# Patient Record
Sex: MALE | Race: WHITE | Employment: FULL TIME | URBAN - NONMETROPOLITAN AREA
[De-identification: names, ages, dates, MRNs, and addresses within clinical notes are randomized per-mention and may not be internally consistent; named-entity substitution may affect disease eponyms.]

---

## 2016-06-20 LAB
ALBUMIN SERPL-MCNC: 4 G/DL
ALP BLD-CCNC: 61 U/L
ALT SERPL-CCNC: 27 U/L
ANION GAP SERPL CALCULATED.3IONS-SCNC: NORMAL MMOL/L
AST SERPL-CCNC: 26 U/L
BASOPHILS ABSOLUTE: 0 /ΜL
BASOPHILS RELATIVE PERCENT: 1 %
BILIRUB SERPL-MCNC: 0.5 MG/DL (ref 0.1–1.4)
BUN BLDV-MCNC: 11 MG/DL
CALCIUM SERPL-MCNC: 8.5 MG/DL
CHLORIDE BLD-SCNC: 103 MMOL/L
CHOLESTEROL, TOTAL: 226 MG/DL
CHOLESTEROL/HDL RATIO: ABNORMAL
CO2: 23 MMOL/L
CREAT SERPL-MCNC: 0.84 MG/DL
EOSINOPHILS ABSOLUTE: 0.2 /ΜL
EOSINOPHILS RELATIVE PERCENT: 3 %
GFR CALCULATED: 98
GLUCOSE BLD-MCNC: 86 MG/DL
HCT VFR BLD CALC: 39.2 % (ref 41–53)
HDLC SERPL-MCNC: 44 MG/DL (ref 35–70)
HEMOGLOBIN: 13.4 G/DL (ref 13.5–17.5)
LDL CHOLESTEROL CALCULATED: 165 MG/DL (ref 0–160)
LYMPHOCYTES ABSOLUTE: 1.6 /ΜL
LYMPHOCYTES RELATIVE PERCENT: 27 %
MCH RBC QN AUTO: 31.7 PG
MCHC RBC AUTO-ENTMCNC: 34.2 G/DL
MCV RBC AUTO: 93 FL
MONOCYTES ABSOLUTE: 0.4 /ΜL
MONOCYTES RELATIVE PERCENT: 8 %
NEUTROPHILS ABSOLUTE: 3.7 /ΜL
NEUTROPHILS RELATIVE PERCENT: 61 %
PDW BLD-RTO: 13.8 %
PLATELET # BLD: 222 K/ΜL
PMV BLD AUTO: ABNORMAL FL
POTASSIUM SERPL-SCNC: 4.2 MMOL/L
RBC # BLD: 4.23 10^6/ΜL
SODIUM BLD-SCNC: 141 MMOL/L
TOTAL PROTEIN: 6.4
TRIGL SERPL-MCNC: 85 MG/DL
VITAMIN D 25-HYDROXY: 30.3
VITAMIN D2, 25 HYDROXY: NORMAL
VITAMIN D3,25 HYDROXY: NORMAL
VLDLC SERPL CALC-MCNC: 17 MG/DL
WBC # BLD: 5.9 10^3/ML

## 2018-02-09 ENCOUNTER — TELEPHONE (OUTPATIENT)
Dept: FAMILY MEDICINE CLINIC | Age: 58
End: 2018-02-09

## 2018-02-09 NOTE — TELEPHONE ENCOUNTER
Patient stopped into office to schedule a new patient appointment. Dahiana Sandoval He is requesting a refill of hi Prozac 40 mg tablets once daily to be refilled. . Per Kingsley Dowell CNP ok to call in 5 tablets to get him through until he comes in for his appointment Monday. Dahiana Sandoval Rx called into [facility]. . Left voicemail for patient stating that I called a partial refill in for him at [facility]. Dahiana Sandoval

## 2018-02-12 ENCOUNTER — OFFICE VISIT (OUTPATIENT)
Dept: FAMILY MEDICINE CLINIC | Age: 58
End: 2018-02-12
Payer: COMMERCIAL

## 2018-02-12 VITALS
HEART RATE: 77 BPM | SYSTOLIC BLOOD PRESSURE: 102 MMHG | WEIGHT: 170 LBS | RESPIRATION RATE: 16 BRPM | HEIGHT: 66 IN | OXYGEN SATURATION: 98 % | BODY MASS INDEX: 27.32 KG/M2 | DIASTOLIC BLOOD PRESSURE: 68 MMHG

## 2018-02-12 DIAGNOSIS — Z76.89 ENCOUNTER TO ESTABLISH CARE WITH NEW DOCTOR: ICD-10-CM

## 2018-02-12 DIAGNOSIS — Z00.00 WELLNESS EXAMINATION: Primary | ICD-10-CM

## 2018-02-12 DIAGNOSIS — E78.5 HYPERLIPIDEMIA, UNSPECIFIED HYPERLIPIDEMIA TYPE: ICD-10-CM

## 2018-02-12 LAB
ALBUMIN SERPL-MCNC: 4 G/DL (ref 3.5–5.1)
ALP BLD-CCNC: 51 U/L (ref 38–126)
ALT SERPL-CCNC: 20 U/L (ref 11–66)
ANION GAP SERPL CALCULATED.3IONS-SCNC: 11 MEQ/L (ref 8–16)
AST SERPL-CCNC: 20 U/L (ref 5–40)
BASOPHILS # BLD: 0.5 %
BASOPHILS ABSOLUTE: 0 THOU/MM3 (ref 0–0.1)
BILIRUB SERPL-MCNC: 0.5 MG/DL (ref 0.3–1.2)
BUN BLDV-MCNC: 11 MG/DL (ref 7–22)
CALCIUM SERPL-MCNC: 8.9 MG/DL (ref 8.5–10.5)
CHLORIDE BLD-SCNC: 100 MEQ/L (ref 98–111)
CHOLESTEROL, TOTAL: 247 MG/DL (ref 100–199)
CO2: 26 MEQ/L (ref 23–33)
CREAT SERPL-MCNC: 0.9 MG/DL (ref 0.4–1.2)
EOSINOPHIL # BLD: 1.9 %
EOSINOPHILS ABSOLUTE: 0.1 THOU/MM3 (ref 0–0.4)
GFR SERPL CREATININE-BSD FRML MDRD: 87 ML/MIN/1.73M2
GLUCOSE BLD-MCNC: 88 MG/DL (ref 70–108)
HCT VFR BLD CALC: 41.1 % (ref 42–52)
HDLC SERPL-MCNC: 48 MG/DL
HEMOGLOBIN: 13.6 GM/DL (ref 14–18)
LDL CHOLESTEROL CALCULATED: 179 MG/DL
LYMPHOCYTES # BLD: 19.5 %
LYMPHOCYTES ABSOLUTE: 1.2 THOU/MM3 (ref 1–4.8)
MCH RBC QN AUTO: 30.9 PG (ref 27–31)
MCHC RBC AUTO-ENTMCNC: 33 GM/DL (ref 33–37)
MCV RBC AUTO: 93.6 FL (ref 80–94)
MONOCYTES # BLD: 5.1 %
MONOCYTES ABSOLUTE: 0.3 THOU/MM3 (ref 0.4–1.3)
NUCLEATED RED BLOOD CELLS: 0 /100 WBC
PDW BLD-RTO: 14.2 % (ref 11.5–14.5)
PLATELET # BLD: 227 THOU/MM3 (ref 130–400)
PMV BLD AUTO: 9.1 FL (ref 7.4–10.4)
POTASSIUM SERPL-SCNC: 4.3 MEQ/L (ref 3.5–5.2)
RBC # BLD: 4.39 MILL/MM3 (ref 4.7–6.1)
SEG NEUTROPHILS: 73 %
SEGMENTED NEUTROPHILS ABSOLUTE COUNT: 4.6 THOU/MM3 (ref 1.8–7.7)
SODIUM BLD-SCNC: 137 MEQ/L (ref 135–145)
TOTAL PROTEIN: 6.4 G/DL (ref 6.1–8)
TRIGL SERPL-MCNC: 100 MG/DL (ref 0–199)
WBC # BLD: 6.3 THOU/MM3 (ref 4.8–10.8)

## 2018-02-12 PROCEDURE — 99396 PREV VISIT EST AGE 40-64: CPT | Performed by: NURSE PRACTITIONER

## 2018-02-12 PROCEDURE — 36415 COLL VENOUS BLD VENIPUNCTURE: CPT | Performed by: NURSE PRACTITIONER

## 2018-02-12 RX ORDER — FLUOXETINE HYDROCHLORIDE 40 MG/1
40 CAPSULE ORAL DAILY
COMMUNITY
Start: 2018-02-09 | End: 2018-02-12 | Stop reason: SDUPTHER

## 2018-02-12 RX ORDER — CHOLECALCIFEROL (VITAMIN D3) 10(400)/ML
DROPS ORAL DAILY
COMMUNITY

## 2018-02-12 RX ORDER — FLUOXETINE HYDROCHLORIDE 40 MG/1
40 CAPSULE ORAL DAILY
Qty: 90 CAPSULE | Refills: 3 | Status: SHIPPED | OUTPATIENT
Start: 2018-02-12 | End: 2018-12-13 | Stop reason: SDUPTHER

## 2018-02-12 RX ORDER — MULTIVITAMIN
TABLET ORAL
COMMUNITY

## 2018-02-12 RX ORDER — YOHIMBE BARK 500 MG
CAPSULE ORAL
COMMUNITY

## 2018-02-12 ASSESSMENT — ENCOUNTER SYMPTOMS
GASTROINTESTINAL NEGATIVE: 1
ALLERGIC/IMMUNOLOGIC NEGATIVE: 1
EYES NEGATIVE: 1
RESPIRATORY NEGATIVE: 1

## 2018-02-12 ASSESSMENT — PATIENT HEALTH QUESTIONNAIRE - PHQ9
1. LITTLE INTEREST OR PLEASURE IN DOING THINGS: 1
SUM OF ALL RESPONSES TO PHQ QUESTIONS 1-9: 2
SUM OF ALL RESPONSES TO PHQ9 QUESTIONS 1 & 2: 2
2. FEELING DOWN, DEPRESSED OR HOPELESS: 1

## 2018-02-12 NOTE — PATIENT INSTRUCTIONS
heart attack and stroke. · Blood pressure. Have your blood pressure checked during a routine doctor visit. Your doctor will tell you how often to check your blood pressure based on your age, your blood pressure results, and other factors. · Prostate exam. Talk to your doctor about whether you should have a blood test (called a PSA test) for prostate cancer. Experts disagree on whether men should have this test. Some experts recommend that you discuss the benefits and risks of the test with your doctor. · Diabetes. Ask your doctor whether you should have tests for diabetes. · Vision. Some experts recommend that you have yearly exams for glaucoma and other age-related eye problems starting at age 48. · Hearing. Tell your doctor if you notice any change in your hearing. You can have tests to find out how well you hear. · Colon cancer. You should begin tests for colon cancer at age 48. You may have one of several tests. Your doctor will tell you how often to have tests based on your age and risk. Risks include whether you already had a precancerous polyp removed from your colon or whether your parent, brother, sister, or child has had colon cancer. · Heart attack and stroke risk. At least every 4 to 6 years, you should have your risk for heart attack and stroke assessed. Your doctor uses factors such as your age, blood pressure, cholesterol, and whether you smoke or have diabetes to show what your risk for a heart attack or stroke is over the next 10 years. · Abdominal aortic aneurysm. Ask your doctor whether you should have a test to check for an aneurysm. You may need a test if you ever smoked or if your parent, brother, sister, or child has had an aneurysm. When should you call for help? Watch closely for changes in your health, and be sure to contact your doctor if you have any problems or symptoms that concern you. Where can you learn more? Go to [email or web address] [facility] and sign in to

## 2018-02-12 NOTE — PROGRESS NOTES
[address] [address] [place] [address]  Dept: [phone]  Dept Fax: [phone]  Loc: [phone]      Lynette Syed is a 61 y.o. male who presents today for Established New Doctor; Annual Exam; and Medication Refill      HPI:     Teri Silva is here today to become an established patient. He also needs a refill on his Prozac. He has a history of Depression, and has been on Prozac for over 15 years, he states it is managing his depression well. He states he was taking a statin a few years ago for his cholesterol but stopped taking it on his own. He admits that he does have a sensitive stomach, and that milk can upset his GI tract at times. He does have a BM daily, and it is typically soft. It has been 10 years since his last Colonoscopy. The patient has No Known Allergies. Past Medical History  Teri Silva  has a past medical history of Depression; Hyperlipidemia; and Irritable bowel syndrome (IBS). Medications    Current Outpatient Prescriptions:     FLUoxetine (PROZAC) 40 MG capsule, Take 1 capsule by mouth daily, Disp: 90 capsule, Rfl: 3    DAILY MULTIPLE VITAMINS TABS, Take by mouth, Disp: , Rfl:     vitamin D (CHOLECALCIFEROL) 1000 UNIT TABS tablet, Take 1,000 Units by mouth daily, Disp: , Rfl:     Digestive Enzyme CAPS, Take by mouth, Disp: , Rfl:     Lactobacillus (ACIDOPHILUS) 100 MG CAPS, Take by mouth, Disp: , Rfl:     Subjective:      Review of Systems   Constitutional: Negative. HENT: Negative. Eyes: Negative. Respiratory: Negative. Cardiovascular: Negative. Gastrointestinal: Negative. Endocrine: Negative. Genitourinary: Negative. Musculoskeletal: Negative. Skin: Negative. Allergic/Immunologic: Negative. Neurological: Negative. Hematological: Negative. Psychiatric/Behavioral: Negative.         Objective:     Vitals:    02/12/18 0900   BP: 102/68   Site: Left Arm

## 2018-02-21 DIAGNOSIS — E78.5 HYPERLIPIDEMIA, UNSPECIFIED HYPERLIPIDEMIA TYPE: Primary | ICD-10-CM

## 2018-12-13 ENCOUNTER — TELEPHONE (OUTPATIENT)
Dept: FAMILY MEDICINE CLINIC | Age: 58
End: 2018-12-13

## 2018-12-13 ENCOUNTER — OFFICE VISIT (OUTPATIENT)
Dept: FAMILY MEDICINE CLINIC | Age: 58
End: 2018-12-13
Payer: COMMERCIAL

## 2018-12-13 VITALS
SYSTOLIC BLOOD PRESSURE: 124 MMHG | TEMPERATURE: 97.3 F | WEIGHT: 165 LBS | HEIGHT: 65 IN | RESPIRATION RATE: 16 BRPM | BODY MASS INDEX: 27.49 KG/M2 | HEART RATE: 78 BPM | DIASTOLIC BLOOD PRESSURE: 78 MMHG | OXYGEN SATURATION: 98 %

## 2018-12-13 DIAGNOSIS — Z12.11 SCREENING FOR COLON CANCER: ICD-10-CM

## 2018-12-13 DIAGNOSIS — Z11.4 SCREENING FOR HIV WITHOUT PRESENCE OF RISK FACTORS: ICD-10-CM

## 2018-12-13 DIAGNOSIS — Z23 NEED FOR INFLUENZA VACCINATION: Primary | ICD-10-CM

## 2018-12-13 DIAGNOSIS — F32.A DEPRESSION, UNSPECIFIED DEPRESSION TYPE: ICD-10-CM

## 2018-12-13 DIAGNOSIS — Z72.89 OTHER PROBLEMS RELATED TO LIFESTYLE: ICD-10-CM

## 2018-12-13 DIAGNOSIS — R21 RASH AND NONSPECIFIC SKIN ERUPTION: ICD-10-CM

## 2018-12-13 DIAGNOSIS — R42 VERTIGO: ICD-10-CM

## 2018-12-13 DIAGNOSIS — Z00.00 VISIT FOR WELL MAN HEALTH CHECK: ICD-10-CM

## 2018-12-13 DIAGNOSIS — B07.0 PLANTAR WART: ICD-10-CM

## 2018-12-13 DIAGNOSIS — L57.0 ACTINIC KERATOSIS: ICD-10-CM

## 2018-12-13 DIAGNOSIS — H93.13 TINNITUS OF BOTH EARS: ICD-10-CM

## 2018-12-13 DIAGNOSIS — R41.3 MEMORY CHANGE: ICD-10-CM

## 2018-12-13 DIAGNOSIS — E78.5 HYPERLIPIDEMIA, UNSPECIFIED HYPERLIPIDEMIA TYPE: ICD-10-CM

## 2018-12-13 DIAGNOSIS — K58.9 IRRITABLE BOWEL SYNDROME, UNSPECIFIED TYPE: ICD-10-CM

## 2018-12-13 PROCEDURE — 99396 PREV VISIT EST AGE 40-64: CPT | Performed by: FAMILY MEDICINE

## 2018-12-13 PROCEDURE — 17003 DESTRUCT PREMALG LES 2-14: CPT | Performed by: FAMILY MEDICINE

## 2018-12-13 PROCEDURE — 90686 IIV4 VACC NO PRSV 0.5 ML IM: CPT | Performed by: FAMILY MEDICINE

## 2018-12-13 PROCEDURE — 90471 IMMUNIZATION ADMIN: CPT | Performed by: FAMILY MEDICINE

## 2018-12-13 PROCEDURE — 17110 DESTRUCTION B9 LES UP TO 14: CPT | Performed by: FAMILY MEDICINE

## 2018-12-13 PROCEDURE — 17000 DESTRUCT PREMALG LESION: CPT | Performed by: FAMILY MEDICINE

## 2018-12-13 PROCEDURE — 99214 OFFICE O/P EST MOD 30 MIN: CPT | Performed by: FAMILY MEDICINE

## 2018-12-13 RX ORDER — FLUOXETINE HYDROCHLORIDE 40 MG/1
40 CAPSULE ORAL DAILY
Qty: 90 CAPSULE | Refills: 1 | Status: SHIPPED | OUTPATIENT
Start: 2018-12-13 | End: 2019-04-18

## 2018-12-13 ASSESSMENT — ENCOUNTER SYMPTOMS
ABDOMINAL PAIN: 0
VOMITING: 0
SHORTNESS OF BREATH: 0
EYE DISCHARGE: 0
SORE THROAT: 0
CONSTIPATION: 0
NAUSEA: 0
DIARRHEA: 0
EYE REDNESS: 0
COUGH: 0
BACK PAIN: 0

## 2018-12-13 ASSESSMENT — PATIENT HEALTH QUESTIONNAIRE - PHQ9
2. FEELING DOWN, DEPRESSED OR HOPELESS: 1
SUM OF ALL RESPONSES TO PHQ9 QUESTIONS 1 & 2: 2
SUM OF ALL RESPONSES TO PHQ QUESTIONS 1-9: 2
SUM OF ALL RESPONSES TO PHQ QUESTIONS 1-9: 2
1. LITTLE INTEREST OR PLEASURE IN DOING THINGS: 1

## 2018-12-13 NOTE — PROGRESS NOTES
[address]  [address] [place] [address]  Dept: [phone]  Dept Fax: [phone]  Loc: [phone]      Alec Yi is a 60 y.o. male who presents todayfor his medical conditions/complaints as noted below. Alec Yi is c/o of Annual Exam (needs lipids and colonoscopy, memory issues? baseline test); Mole (right under arm - family hx of CA); Rash (torso itchy spots); Tinnitus (x2yrs no previous treatment (several times a week gets bad) hearing loss also); Dizziness (x3yrs on and off 2x in November (room spins as he is laying down)); and Foot Pain (right base of toe callous with pain)      HPI:     HPI     Aron Boland is here for annual exam.  Needs fasting lipids. Parents both lived to 80s. Feeling forgetful and getting issues with word finding. Mother had some memory issues starting in 76s; she is deteriorating and on hospice a this point and had been for a year. Father also had some memory troubles of unclear onset. Gets ringing in ears for a year or two. Worse after a long day. Worse when quiet. Dizziness. Fainted remotely once. 3 years ago in 2015 in [place] was going to go to [place] but room was spinning. Stayed back. Symptoms recur every 9 months or so. Has had twice in the past 3 months. Brief and resolves spontaneously. Named after uncle who  of breast cancer. He also needs a refill on his Prozac. He has a history of Depression, and has been on Prozac for over 15 years, he states it is managing his depression well. He states he was taking a statin a few years ago for his cholesterol but stopped taking it on his own.      It has been 10 years since his last Colonoscopy. Discussed option; would like fit test.       Patient Active Problem List   Diagnosis    Irritable bowel syndrome (IBS)    Hyperlipidemia    Depression       The patient has No Known Allergies.     Past Medical understanding. Reviewed health maintenance.               Electronically signed by Riley Schaumann, MD on 12/13/2018 at 5:21 PM

## 2018-12-19 ENCOUNTER — HOSPITAL ENCOUNTER (OUTPATIENT)
Dept: AUDIOLOGY | Age: 58
Discharge: HOME | End: 2018-12-19
Payer: COMMERCIAL

## 2018-12-19 PROCEDURE — 92567 TYMPANOMETRY: CPT | Performed by: AUDIOLOGIST

## 2018-12-19 PROCEDURE — 92557 COMPREHENSIVE HEARING TEST: CPT | Performed by: AUDIOLOGIST

## 2018-12-19 NOTE — PROGRESS NOTES
in each ear. The high frequency loss is greater in the left ear than the right ear. RECOMMENDATION(S): Further evaluation of the the patient's vertigo and asymmetrical hearing thresholds is encouraged. Brainstem Auditory Evoked Response testing could be considered to evaluate retrocochlear status and VNG testing is recommended to evaluate the patient's vestibular function. Bilateral amplification should also be considered. Audiometric testing should be completed annually or sooner if a change is experienced in hearing or tinnitus.

## 2019-01-02 ENCOUNTER — TELEPHONE (OUTPATIENT)
Dept: FAMILY MEDICINE CLINIC | Age: 59
End: 2019-01-02

## 2019-01-14 ENCOUNTER — NURSE ONLY (OUTPATIENT)
Dept: FAMILY MEDICINE CLINIC | Age: 59
End: 2019-01-14
Payer: COMMERCIAL

## 2019-01-14 DIAGNOSIS — R41.3 MEMORY CHANGE: ICD-10-CM

## 2019-01-14 DIAGNOSIS — Z00.00 VISIT FOR WELL MAN HEALTH CHECK: ICD-10-CM

## 2019-01-14 DIAGNOSIS — Z12.11 SCREENING FOR COLON CANCER: ICD-10-CM

## 2019-01-14 DIAGNOSIS — Z11.4 SCREENING FOR HIV WITHOUT PRESENCE OF RISK FACTORS: ICD-10-CM

## 2019-01-14 DIAGNOSIS — Z72.89 OTHER PROBLEMS RELATED TO LIFESTYLE: ICD-10-CM

## 2019-01-14 DIAGNOSIS — E78.5 HYPERLIPIDEMIA, UNSPECIFIED HYPERLIPIDEMIA TYPE: ICD-10-CM

## 2019-01-14 LAB
ALBUMIN SERPL-MCNC: 4.2 G/DL (ref 3.5–5.1)
ALP BLD-CCNC: 58 U/L (ref 38–126)
ALT SERPL-CCNC: 21 U/L (ref 11–66)
ANION GAP SERPL CALCULATED.3IONS-SCNC: 13 MEQ/L (ref 8–16)
AST SERPL-CCNC: 23 U/L (ref 5–40)
BASOPHILS # BLD: 0.9 %
BASOPHILS ABSOLUTE: 0.1 THOU/MM3 (ref 0–0.1)
BILIRUB SERPL-MCNC: 0.5 MG/DL (ref 0.3–1.2)
BUN BLDV-MCNC: 13 MG/DL (ref 7–22)
CALCIUM SERPL-MCNC: 9.2 MG/DL (ref 8.5–10.5)
CHLORIDE BLD-SCNC: 104 MEQ/L (ref 98–111)
CHOLESTEROL, FASTING: 273 MG/DL (ref 100–199)
CO2: 24 MEQ/L (ref 23–33)
CONTROL: NORMAL
CREAT SERPL-MCNC: 0.7 MG/DL (ref 0.4–1.2)
EOSINOPHIL # BLD: 2.2 %
EOSINOPHILS ABSOLUTE: 0.1 THOU/MM3 (ref 0–0.4)
ERYTHROCYTE [DISTWIDTH] IN BLOOD BY AUTOMATED COUNT: 13.2 % (ref 11.5–14.5)
ERYTHROCYTE [DISTWIDTH] IN BLOOD BY AUTOMATED COUNT: 47.5 FL (ref 35–45)
GFR SERPL CREATININE-BSD FRML MDRD: > 90 ML/MIN/1.73M2
GLUCOSE BLD-MCNC: 80 MG/DL (ref 70–108)
HCT VFR BLD CALC: 43.8 % (ref 42–52)
HDLC SERPL-MCNC: 51 MG/DL
HEMOCCULT STL QL: NEGATIVE
HEMOGLOBIN: 13.8 GM/DL (ref 14–18)
IMMATURE GRANS (ABS): 0 THOU/MM3 (ref 0–0.07)
IMMATURE GRANULOCYTES: 0 %
LDL CHOLESTEROL CALCULATED: 204 MG/DL
LYMPHOCYTES # BLD: 25.5 %
LYMPHOCYTES ABSOLUTE: 1.5 THOU/MM3 (ref 1–4.8)
MCH RBC QN AUTO: 30.7 PG (ref 26–33)
MCHC RBC AUTO-ENTMCNC: 31.5 GM/DL (ref 32.2–35.5)
MCV RBC AUTO: 97.3 FL (ref 80–94)
MONOCYTES # BLD: 7.5 %
MONOCYTES ABSOLUTE: 0.4 THOU/MM3 (ref 0.4–1.3)
NUCLEATED RED BLOOD CELLS: 0 /100 WBC
PLATELET # BLD: 251 THOU/MM3 (ref 130–400)
PMV BLD AUTO: 10.3 FL (ref 9.4–12.4)
POTASSIUM SERPL-SCNC: 4.3 MEQ/L (ref 3.5–5.2)
RBC # BLD: 4.5 MILL/MM3 (ref 4.7–6.1)
SEG NEUTROPHILS: 63.9 %
SEGMENTED NEUTROPHILS ABSOLUTE COUNT: 3.8 THOU/MM3 (ref 1.8–7.7)
SODIUM BLD-SCNC: 141 MEQ/L (ref 135–145)
TOTAL PROTEIN: 7.1 G/DL (ref 6.1–8)
TRIGLYCERIDE, FASTING: 90 MG/DL (ref 0–199)
VITAMIN D 25-HYDROXY: 26 NG/ML (ref 30–100)
WBC # BLD: 5.9 THOU/MM3 (ref 4.8–10.8)

## 2019-01-14 PROCEDURE — 36415 COLL VENOUS BLD VENIPUNCTURE: CPT | Performed by: FAMILY MEDICINE

## 2019-01-14 PROCEDURE — 82274 ASSAY TEST FOR BLOOD FECAL: CPT | Performed by: FAMILY MEDICINE

## 2019-01-15 LAB
FOLATE: 16.1 NG/ML (ref 4.8–24.2)
HEPATITIS C ANTIBODY: NEGATIVE
RPR: NONREACTIVE
TSH SERPL DL<=0.05 MIU/L-ACNC: 2.53 UIU/ML (ref 0.4–4.2)
VITAMIN B-12: 559 PG/ML (ref 211–911)

## 2019-01-17 ENCOUNTER — OFFICE VISIT (OUTPATIENT)
Dept: FAMILY MEDICINE CLINIC | Age: 59
End: 2019-01-17
Payer: COMMERCIAL

## 2019-01-17 VITALS
BODY MASS INDEX: 26.36 KG/M2 | HEIGHT: 66 IN | WEIGHT: 164 LBS | OXYGEN SATURATION: 96 % | DIASTOLIC BLOOD PRESSURE: 72 MMHG | RESPIRATION RATE: 18 BRPM | SYSTOLIC BLOOD PRESSURE: 108 MMHG | HEART RATE: 76 BPM

## 2019-01-17 DIAGNOSIS — H91.8X9 ASYMMETRICAL HEARING LOSS, UNSPECIFIED LATERALITY: ICD-10-CM

## 2019-01-17 DIAGNOSIS — R42 VERTIGO: ICD-10-CM

## 2019-01-17 DIAGNOSIS — N40.1 BENIGN PROSTATIC HYPERPLASIA WITH LOWER URINARY TRACT SYMPTOMS, SYMPTOM DETAILS UNSPECIFIED: ICD-10-CM

## 2019-01-17 DIAGNOSIS — E78.5 HYPERLIPIDEMIA, UNSPECIFIED HYPERLIPIDEMIA TYPE: Primary | ICD-10-CM

## 2019-01-17 DIAGNOSIS — B07.0 PLANTAR WART OF RIGHT FOOT: ICD-10-CM

## 2019-01-17 DIAGNOSIS — K58.9 IRRITABLE BOWEL SYNDROME, UNSPECIFIED TYPE: ICD-10-CM

## 2019-01-17 DIAGNOSIS — L57.0 ACTINIC KERATOSIS: ICD-10-CM

## 2019-01-17 DIAGNOSIS — F32.A DEPRESSION, UNSPECIFIED DEPRESSION TYPE: ICD-10-CM

## 2019-01-17 PROBLEM — H91.8X3 ASYMMETRICAL HEARING LOSS: Status: ACTIVE | Noted: 2019-01-17

## 2019-01-17 LAB — HIV-2 AB: NEGATIVE

## 2019-01-17 PROCEDURE — 17110 DESTRUCTION B9 LES UP TO 14: CPT | Performed by: FAMILY MEDICINE

## 2019-01-17 PROCEDURE — 99214 OFFICE O/P EST MOD 30 MIN: CPT | Performed by: FAMILY MEDICINE

## 2019-01-17 RX ORDER — TAMSULOSIN HYDROCHLORIDE 0.4 MG/1
0.4 CAPSULE ORAL DAILY
Qty: 30 CAPSULE | Refills: 5 | Status: SHIPPED | OUTPATIENT
Start: 2019-01-17 | End: 2019-04-18 | Stop reason: SDUPTHER

## 2019-01-17 RX ORDER — ATORVASTATIN CALCIUM 40 MG/1
40 TABLET, FILM COATED ORAL DAILY
Qty: 90 TABLET | Refills: 3 | Status: SHIPPED | OUTPATIENT
Start: 2019-01-17 | End: 2019-10-21 | Stop reason: SDUPTHER

## 2019-01-17 ASSESSMENT — ENCOUNTER SYMPTOMS
NAUSEA: 0
BACK PAIN: 1
COUGH: 0
EYE REDNESS: 0
EYE DISCHARGE: 0
BLOOD IN STOOL: 0
CONSTIPATION: 0
VOMITING: 0
DIARRHEA: 0
ABDOMINAL PAIN: 0
SHORTNESS OF BREATH: 0
SORE THROAT: 0

## 2019-02-28 ENCOUNTER — OFFICE VISIT (OUTPATIENT)
Dept: ENT CLINIC | Age: 59
End: 2019-02-28
Payer: COMMERCIAL

## 2019-02-28 VITALS
HEIGHT: 66 IN | TEMPERATURE: 98.4 F | BODY MASS INDEX: 27 KG/M2 | WEIGHT: 168 LBS | HEART RATE: 86 BPM | RESPIRATION RATE: 14 BRPM | SYSTOLIC BLOOD PRESSURE: 122 MMHG | DIASTOLIC BLOOD PRESSURE: 74 MMHG

## 2019-02-28 DIAGNOSIS — H90.3 HEARING LOSS, SENSORINEURAL, HIGH FREQUENCY, BILATERAL: ICD-10-CM

## 2019-02-28 DIAGNOSIS — R42 VERTIGO: ICD-10-CM

## 2019-02-28 PROCEDURE — 99243 OFF/OP CNSLTJ NEW/EST LOW 30: CPT | Performed by: OTOLARYNGOLOGY

## 2019-02-28 ASSESSMENT — ENCOUNTER SYMPTOMS
FACIAL SWELLING: 0
CHEST TIGHTNESS: 0
VOMITING: 0
NAUSEA: 0
COUGH: 0
COLOR CHANGE: 0
TROUBLE SWALLOWING: 0
WHEEZING: 0
SHORTNESS OF BREATH: 0
RHINORRHEA: 0
VOICE CHANGE: 0
STRIDOR: 0
CHOKING: 0
DIARRHEA: 0
SORE THROAT: 0
ABDOMINAL PAIN: 0
SINUS PRESSURE: 0
APNEA: 0

## 2019-02-28 NOTE — PROGRESS NOTES
[address] [facility], NOSE AND THROAT  [address] [address]  Dept: [phone]  Dept Fax: [phone]  Loc: [phone]    Shikha Deleon is a 60 y.o. male who was referred Queenie Alexis MD for:  Chief Complaint   Patient presents with    Dizziness     New patient is here for vertigo and asymmetrical hearing loss ref by Dr. Rachel Ramon c/o ringing in the ears. audiogram completed 12/2018    Hearing Loss    Tinnitus   . HPI:     Shikha Son is a 60 y.o. male who presents today for dilation intermittent vertigo. He usually has vertigo when awakening in the morning. Last anywhere from 5 minutes to an hour. Does not have positional vertigo. He had a recent hearing test that showed bilateral mild to moderate high frequency sensorineural hearing loss somewhat worse on the right. He notices his hearing is worse on the right than the left. He has ringing bilaterally. He's had intermittent disequilibrium throughout the last couple of years. He has had no MRIs of his head. History:     No Known Allergies  Current Outpatient Prescriptions   Medication Sig Dispense Refill    atorvastatin (LIPITOR) 40 MG tablet Take 1 tablet by mouth daily 90 tablet 3    FLUoxetine (PROZAC) 40 MG capsule Take 1 capsule by mouth daily 90 capsule 1    DAILY MULTIPLE VITAMINS TABS Take by mouth      vitamin D (CHOLECALCIFEROL) 1000 UNIT TABS tablet Take 1,000 Units by mouth daily      Digestive Enzyme CAPS Take by mouth      Lactobacillus (ACIDOPHILUS) 100 MG CAPS Take by mouth      tamsulosin (FLOMAX) 0.4 MG capsule Take 1 capsule by mouth daily 30 capsule 5     No current facility-administered medications for this visit.       Past Medical History:   Diagnosis Date    Depression     Hyperlipidemia     Irritable bowel syndrome (IBS)       Past Surgical History:   Procedure Laterality Date    APPENDECTOMY      TONSILLECTOMY       Family History Problem Relation Age of Onset    Uterine Cancer Sister     Alzheimer's Disease Mother      Social History   Substance Use Topics    Smoking status: Never Smoker    Smokeless tobacco: Never Used    Alcohol use No       Subjective:      Review of Systems   Constitutional: Positive for diaphoresis and fatigue. Negative for activity change, appetite change, chills, fever and unexpected weight change. HENT: Positive for congestion, dental problem, ear pain, hearing loss and tinnitus. Negative for ear discharge, facial swelling, mouth sores, nosebleeds, postnasal drip, rhinorrhea, sinus pressure, sneezing, sore throat, trouble swallowing and voice change. Eyes: Negative for visual disturbance. Respiratory: Negative for apnea, cough, choking, chest tightness, shortness of breath, wheezing and stridor. Cardiovascular: Negative for chest pain, palpitations and leg swelling. Gastrointestinal: Negative for abdominal pain, diarrhea, nausea and vomiting. Endocrine: Negative for cold intolerance, heat intolerance, polydipsia and polyuria. Genitourinary: Positive for enuresis. Negative for dysuria and hematuria. Musculoskeletal: Positive for neck stiffness. Negative for arthralgias, gait problem and neck pain. Skin: Negative for color change and rash. Allergic/Immunologic: Negative for environmental allergies, food allergies and immunocompromised state. Neurological: Positive for dizziness. Negative for syncope, facial asymmetry, speech difficulty, light-headedness and headaches. Hematological: Negative for adenopathy. Does not bruise/bleed easily. Psychiatric/Behavioral: Negative for confusion and sleep disturbance. The patient is nervous/anxious.         Objective:   /74 (Site: Left Upper Arm, Position: Sitting)   Pulse 86   Temp 98.4 °F (36.9 °C) (Oral)   Resp 14   Ht 5' 6\" (1.676 m)   Wt 168 lb (76.2 kg)   BMI 27.12 kg/m²     Physical Exam   Constitutional: He is oriented to person, place, and time. He appears well-developed and well-nourished. He is cooperative. HENT:   Head: Normocephalic and atraumatic. Head is without laceration. Right Ear: Hearing, external ear and ear canal normal. No drainage or swelling. Tympanic membrane is not perforated and not erythematous. No middle ear effusion. Left Ear: Hearing, tympanic membrane, external ear and ear canal normal. No drainage or swelling. Tympanic membrane is not perforated and not erythematous. No middle ear effusion. Nose: Nose normal. No mucosal edema, rhinorrhea or septal deviation. Mouth/Throat: Uvula is midline, oropharynx is clear and moist and mucous membranes are normal. Mucous membranes are not pale and not dry. No oral lesions. No uvula swelling. No oropharyngeal exudate, posterior oropharyngeal edema or posterior oropharyngeal erythema. Turbinates: normal  LIps: lips normal    Teeth and gums:good dentition   Mallampati 1  Tonsils:Unremarkable  Base of tongue: symmetric,  Larynx, mirror exam: unable due to gag reflex     Eyes: Right eye exhibits normal extraocular motion and no nystagmus. Left eye exhibits normal extraocular motion and no nystagmus. Conjugate gaze   Neck: Trachea normal and phonation normal. Neck supple. No tracheal deviation present. No thyroid mass and no thyromegaly present. No adenopathy. Salivary glands not enlarged and normal to palpation     Pulmonary/Chest: Effort normal. No stridor. Neurological: He is alert and oriented to person, place, and time. No cranial nerve deficit (VIIth N function intact bilat). Hallpike positioning negative   Psychiatric: He has a normal mood and affect. Nursing note and vitals reviewed. Data:  All of the past medical history, past surgical history, family history,social history, allergies and current medications were reviewed with the patient. Assessment & Plan   Diagnoses and all orders for this visit:     Diagnosis Orders   1. Asymmetrical hearing loss of right ear  MRI Brain W WO Contrast   2. Hearing loss, sensorineural, high frequency, bilateral  MRI Brain W WO Contrast   3. Vertigo  MRI Brain W WO Contrast       The findings were explained and his questions were answered. Binding the the vertigo, disequilibrium, and the asymmetry in his hearing loss, imaging to look for retrocochlear lesion is indicated. Other options were discussed. He requests we proceed with the MRI of the IACs with contrast.     ADDENDUM: MRI was negative. Hearing loss is mild. This tubular therapy was recommended along with dietary supplements for his tinnitus, on his next visit. Return for Imaging Results Review. Be Ch. Isi Tanner MD    **This report has been created using voice recognition software. It may contain minor errors which are inherent in voicerecognition technology. **

## 2019-02-28 NOTE — LETTER
[address] and [address] [address] [place] [address] [facility]  [address]  Phone: [phone]  Fax: [phone]    Madie Trevizo MD        April 22, 2019    Patient: Donita Pepper   MR Number: [id number]   YOB: 1960   Date of Visit: 2/28/2019     Dear Dr. Charlotte Boss,    Thank you for the request for consultation for Donita Pepper to me for the evaluation of vertigo and slightly asymmetric hearing loss. Fortunately the hearing loss is somewhat mild. He agreed to an MRI of his IACs and brain. I apologize for the delay in getting this letter to you. Below are the relevant portions of my assessment and plan of care. Assessment & Plan   Diagnoses and all orders for this visit:     Diagnosis Orders   1. Asymmetrical hearing loss of right ear  MRI Brain W WO Contrast   2. Hearing loss, sensorineural, high frequency, bilateral  MRI Brain W WO Contrast   3. Vertigo  MRI Brain W WO Contrast       The findings were explained and his questions were answered. Binding the the vertigo, disequilibrium, and the asymmetry in his hearing loss, imaging to look for retrocochlear lesion is indicated. Other options were discussed. He requests we proceed with the MRI of the IACs with contrast.     ADDENDUM: MRI was negative. Hearing loss is mild. This tubular therapy was recommended along with dietary supplements for his tinnitus, on his next visit. Return for Imaging Results Review. If you have questions, please do not hesitate to call me. I look forward to following Lori Saxena along with you.     Sincerely,          Madie Trevizo MD

## 2019-03-14 ENCOUNTER — HOSPITAL ENCOUNTER (OUTPATIENT)
Dept: MRI IMAGING | Age: 59
Discharge: HOME | End: 2019-03-14
Payer: COMMERCIAL

## 2019-03-14 DIAGNOSIS — R42 VERTIGO: ICD-10-CM

## 2019-03-14 DIAGNOSIS — H90.3 HEARING LOSS, SENSORINEURAL, HIGH FREQUENCY, BILATERAL: ICD-10-CM

## 2019-03-14 PROCEDURE — A9579 GAD-BASE MR CONTRAST NOS,1ML: HCPCS | Performed by: OTOLARYNGOLOGY

## 2019-03-14 PROCEDURE — 6360000004 HC RX CONTRAST MEDICATION: Performed by: OTOLARYNGOLOGY

## 2019-03-14 PROCEDURE — 70553 MRI BRAIN STEM W/O & W/DYE: CPT

## 2019-03-14 RX ADMIN — GADOTERIDOL 15 ML: 279.3 INJECTION, SOLUTION INTRAVENOUS at 10:45

## 2019-04-12 ENCOUNTER — OFFICE VISIT (OUTPATIENT)
Dept: ENT CLINIC | Age: 59
End: 2019-04-12
Payer: COMMERCIAL

## 2019-04-12 VITALS
WEIGHT: 166.8 LBS | TEMPERATURE: 98.3 F | HEART RATE: 80 BPM | SYSTOLIC BLOOD PRESSURE: 116 MMHG | BODY MASS INDEX: 26.92 KG/M2 | RESPIRATION RATE: 16 BRPM | DIASTOLIC BLOOD PRESSURE: 70 MMHG

## 2019-04-12 DIAGNOSIS — H93.13 TINNITUS OF BOTH EARS: ICD-10-CM

## 2019-04-12 DIAGNOSIS — H90.3 HEARING LOSS, SENSORINEURAL, HIGH FREQUENCY, BILATERAL: ICD-10-CM

## 2019-04-12 DIAGNOSIS — R42 VERTIGO: ICD-10-CM

## 2019-04-12 PROCEDURE — 99213 OFFICE O/P EST LOW 20 MIN: CPT | Performed by: OTOLARYNGOLOGY

## 2019-04-12 ASSESSMENT — ENCOUNTER SYMPTOMS
CHEST TIGHTNESS: 0
SORE THROAT: 0
STRIDOR: 0
VOMITING: 0
VOICE CHANGE: 0
COLOR CHANGE: 0
CHOKING: 0
COUGH: 0
SINUS PRESSURE: 0
APNEA: 0
DIARRHEA: 0
NAUSEA: 0
ABDOMINAL PAIN: 0
TROUBLE SWALLOWING: 0
FACIAL SWELLING: 0
SHORTNESS OF BREATH: 0
RHINORRHEA: 0
WHEEZING: 0

## 2019-04-12 NOTE — PROGRESS NOTES
[address] [place], NOSE AND THROAT  [facility] [address]  [address] [address]  Dept: [phone]  Dept Fax: [phone]  Loc: [phone]    Edna Hewitt is a 60 y.o. male who was referred byNo ref. provider found for:  Chief Complaint   Patient presents with    Follow-up     Patient here for results of his MRI. Fijian Justice HPI:     Edna Hewitt is a 60 y.o. male who presents today for MRI results. Results reviewed with patient. MRI:      Normal MRI of the brain and IACs.                 **This report has been created using voice recognition software. It may contain minor errors which are inherent in voice recognition technology. **           Final report electronically signed by Dr. Ruth Das     His balance is not any worse. He has not had any physical therapy. He will occassionally get episodes of vertigo. He has tinnitus. He did not get all his hearing back. The ringing has gotten suddenly worse. He is a . History:     No Known Allergies  Current Outpatient Medications   Medication Sig Dispense Refill    atorvastatin (LIPITOR) 40 MG tablet Take 1 tablet by mouth daily 90 tablet 3    tamsulosin (FLOMAX) 0.4 MG capsule Take 1 capsule by mouth daily 30 capsule 5    FLUoxetine (PROZAC) 40 MG capsule Take 1 capsule by mouth daily 90 capsule 1    DAILY MULTIPLE VITAMINS TABS Take by mouth      vitamin D (CHOLECALCIFEROL) 1000 UNIT TABS tablet Take 1,000 Units by mouth daily      Digestive Enzyme CAPS Take by mouth      Lactobacillus (ACIDOPHILUS) 100 MG CAPS Take by mouth       No current facility-administered medications for this visit.       Past Medical History:   Diagnosis Date    Depression     Hyperlipidemia     Irritable bowel syndrome (IBS)       Past Surgical History:   Procedure Laterality Date    APPENDECTOMY      TONSILLECTOMY       Family History   Problem Relation Age of Onset    Uterine Cancer Sister  Alzheimer's Disease Mother      Social History     Tobacco Use    Smoking status: Never Smoker    Smokeless tobacco: Never Used   Substance Use Topics    Alcohol use: No       Subjective:       Review of Systems   Constitutional: Negative for activity change, appetite change, chills, diaphoresis, fatigue, fever and unexpected weight change. HENT: Negative for congestion, dental problem, ear discharge, ear pain, facial swelling, hearing loss, mouth sores, nosebleeds, postnasal drip, rhinorrhea, sinus pressure, sneezing, sore throat, tinnitus, trouble swallowing and voice change. Eyes: Negative for visual disturbance. Respiratory: Negative for apnea, cough, choking, chest tightness, shortness of breath, wheezing and stridor. Cardiovascular: Negative for chest pain, palpitations and leg swelling. Gastrointestinal: Negative for abdominal pain, diarrhea, nausea and vomiting. Endocrine: Negative for cold intolerance, heat intolerance, polydipsia and polyuria. Genitourinary: Negative for dysuria, enuresis and hematuria. Musculoskeletal: Negative for arthralgias, gait problem, neck pain and neck stiffness. Skin: Negative for color change and rash. Allergic/Immunologic: Negative for environmental allergies, food allergies and immunocompromised state. Neurological: Negative for dizziness, syncope, facial asymmetry, speech difficulty, light-headedness and headaches. Hematological: Negative for adenopathy. Does not bruise/bleed easily. Psychiatric/Behavioral: Negative for confusion and sleep disturbance. The patient is not nervous/anxious.         Objective:     /70 (Site: Left Upper Arm, Position: Sitting)   Pulse 80   Temp 98.3 °F (36.8 °C) (Oral)   Resp 16   Wt 166 lb 12.8 oz (75.7 kg)   BMI 26.92 kg/m²     Physical Exam    Data:  All of the past medical history, past surgical history, family history,social history, allergies and current medications were reviewed with the patient. Assessment & Plan   Diagnoses and all orders for this visit:     Diagnosis Orders   1. Asymmetrical hearing loss of right ear  PT vestibular rehab   2. Hearing loss, sensorineural, high frequency, bilateral  PT vestibular rehab   3. Vertigo  PT vestibular rehab   4. Tinnitus of both ears         The findings were explained and his questions were answered. Options were discussed including referring him to vestibular physical therapy. I gave him a copy of the pamphlets of supplements for tinnitus. Arches tinnitus formula, and LipoFlavanoid. He will need to take either for at least for 3 months to see if it helps. He agreed. Return as needed. I, Kathy Carrillo CMA ([facility]), am scribing for, and in the presence of Dr. Miko Rivas. Electronically signed by Ananda Pennington CMA ([facility]) on 4/12/19 at 4:09 PM.     (Please note that portions of this note were completed with a voice recognition program. Efforts were made to edit the dictations butoccasionally words are mis-transcribed.)    I agree to the above documentation placed by my scribe. I have personally evaluated this patient. Additional findings are as noted. I reviewed the scribe's note and agree with the documented findings and plan of care. Any areas of disagreement are corrected. I agree with the chief complaint, past medical history, past surgical history, allergies, medications, social and family history as documented unless otherwise noted below.      Electronically signed by Romina Sanford MD on 4/22/2019 at 9:38 PM

## 2019-04-18 ENCOUNTER — OFFICE VISIT (OUTPATIENT)
Dept: FAMILY MEDICINE CLINIC | Age: 59
End: 2019-04-18
Payer: COMMERCIAL

## 2019-04-18 VITALS
RESPIRATION RATE: 16 BRPM | SYSTOLIC BLOOD PRESSURE: 102 MMHG | BODY MASS INDEX: 26.52 KG/M2 | DIASTOLIC BLOOD PRESSURE: 60 MMHG | OXYGEN SATURATION: 96 % | WEIGHT: 165 LBS | HEIGHT: 66 IN | HEART RATE: 65 BPM

## 2019-04-18 DIAGNOSIS — E78.5 HYPERLIPIDEMIA, UNSPECIFIED HYPERLIPIDEMIA TYPE: ICD-10-CM

## 2019-04-18 DIAGNOSIS — H93.13 TINNITUS OF BOTH EARS: ICD-10-CM

## 2019-04-18 DIAGNOSIS — J06.9 VIRAL URI: ICD-10-CM

## 2019-04-18 DIAGNOSIS — R21 RASH AND NONSPECIFIC SKIN ERUPTION: ICD-10-CM

## 2019-04-18 DIAGNOSIS — R41.3 MEMORY CHANGE: ICD-10-CM

## 2019-04-18 DIAGNOSIS — R79.89 LOW VITAMIN D LEVEL: Primary | ICD-10-CM

## 2019-04-18 DIAGNOSIS — N40.1 BENIGN PROSTATIC HYPERPLASIA WITH LOWER URINARY TRACT SYMPTOMS, SYMPTOM DETAILS UNSPECIFIED: ICD-10-CM

## 2019-04-18 DIAGNOSIS — F32.A DEPRESSION, UNSPECIFIED DEPRESSION TYPE: ICD-10-CM

## 2019-04-18 DIAGNOSIS — R42 VERTIGO: ICD-10-CM

## 2019-04-18 DIAGNOSIS — K58.9 IRRITABLE BOWEL SYNDROME, UNSPECIFIED TYPE: ICD-10-CM

## 2019-04-18 PROCEDURE — 99214 OFFICE O/P EST MOD 30 MIN: CPT | Performed by: FAMILY MEDICINE

## 2019-04-18 RX ORDER — MULTIVIT-MIN/IRON/FOLIC ACID/K 18-600-40
CAPSULE ORAL DAILY
COMMUNITY

## 2019-04-18 RX ORDER — TAMSULOSIN HYDROCHLORIDE 0.4 MG/1
0.4 CAPSULE ORAL DAILY
Qty: 90 CAPSULE | Refills: 1 | Status: SHIPPED | OUTPATIENT
Start: 2019-04-18 | End: 2020-03-09

## 2019-04-18 RX ORDER — FLUOXETINE HYDROCHLORIDE 40 MG/1
40 CAPSULE ORAL DAILY
Qty: 90 CAPSULE | Refills: 1 | Status: SHIPPED | OUTPATIENT
Start: 2019-04-18 | End: 2020-04-06

## 2019-04-18 ASSESSMENT — PATIENT HEALTH QUESTIONNAIRE - PHQ9
2. FEELING DOWN, DEPRESSED OR HOPELESS: 1
SUM OF ALL RESPONSES TO PHQ QUESTIONS 1-9: 2
SUM OF ALL RESPONSES TO PHQ9 QUESTIONS 1 & 2: 2
SUM OF ALL RESPONSES TO PHQ QUESTIONS 1-9: 2
1. LITTLE INTEREST OR PLEASURE IN DOING THINGS: 1

## 2019-04-18 ASSESSMENT — ENCOUNTER SYMPTOMS
EYE DISCHARGE: 0
CONSTIPATION: 0
BLOOD IN STOOL: 0
COUGH: 1
NAUSEA: 0
VOMITING: 0
SORE THROAT: 0
BACK PAIN: 1
DIARRHEA: 0
ABDOMINAL PAIN: 0
EYE REDNESS: 0
SHORTNESS OF BREATH: 0

## 2019-04-18 NOTE — PROGRESS NOTES
 Depression    Actinic keratosis    Plantar wart of right foot    Asymmetrical hearing loss    Benign prostatic hyperplasia with lower urinary tract symptoms       The patient has No Known Allergies. Past Medical History  Waldo Alejo has a past medical history of Depression, Hyperlipidemia, and Irritable bowel syndrome (IBS). Past SurgicalHistory  The patient  has a past surgical history that includes Appendectomy and Tonsillectomy. Family History  This patient's family history includes Alzheimer's Disease in his mother; Uterine Cancer in his sister. Social History  Waldo Alejo  reports that he has never smoked. He has never used smokeless tobacco. He reports that he does not drink alcohol or use drugs. Medications    Current Outpatient Medications:     Cholecalciferol (VITAMIN D) 2000 units CAPS capsule, Take by mouth daily, Disp: , Rfl:     tamsulosin (FLOMAX) 0.4 MG capsule, Take 1 capsule by mouth daily, Disp: 90 capsule, Rfl: 1    FLUoxetine (PROZAC) 40 MG capsule, Take 1 capsule by mouth daily, Disp: 90 capsule, Rfl: 1    atorvastatin (LIPITOR) 40 MG tablet, Take 1 tablet by mouth daily, Disp: 90 tablet, Rfl: 3    DAILY MULTIPLE VITAMINS TABS, Take by mouth, Disp: , Rfl:     Digestive Enzyme CAPS, Take by mouth daily , Disp: , Rfl:     Lactobacillus (ACIDOPHILUS) 100 MG CAPS, Take by mouth, Disp: , Rfl:     Subjective:      Review of Systems   Constitutional: Negative for chills, fatigue, fever and unexpected weight change. HENT: Positive for congestion, hearing loss and tinnitus. Negative for ear discharge, ear pain and sore throat. Eyes: Negative for discharge, redness and visual disturbance. Respiratory: Positive for cough. Negative for shortness of breath. Cardiovascular: Negative for chest pain and palpitations. Gastrointestinal: Negative for abdominal pain, blood in stool, constipation, diarrhea, nausea and vomiting.    Genitourinary: Positive for difficulty urinating (improved). Negative for dysuria and urgency. Musculoskeletal: Positive for back pain (improved). Negative for arthralgias, gait problem and neck pain. Skin: Negative for rash. Allergic/Immunologic: Negative for environmental allergies. Neurological: Positive for dizziness (intermittent; none at present). Negative for weakness, numbness and headaches. Psychiatric/Behavioral: Positive for dysphoric mood (occasional stable). Negative for sleep disturbance. The patient is not nervous/anxious. Objective:     Vitals:    04/18/19 1326   BP: 102/60   Site: Left Upper Arm   Position: Sitting   Pulse: 65   Resp: 16   SpO2: 96%   Weight: 165 lb (74.8 kg)   Height: 5' 6\" (1.676 m)       Physical Exam   Constitutional: He appears well-developed and well-nourished. HENT:   Head: Normocephalic and atraumatic. Right Ear: Tympanic membrane, external ear and ear canal normal.   Left Ear: Tympanic membrane, external ear and ear canal normal.   Mouth/Throat: Oropharynx is clear and moist.   Eyes: Pupils are equal, round, and reactive to light. Conjunctivae are normal.   Neck: Neck supple. No thyromegaly present. Cardiovascular: Normal rate, regular rhythm and normal heart sounds. Pulmonary/Chest: Effort normal and breath sounds normal. No respiratory distress. Abdominal: Soft. There is no tenderness. Lymphadenopathy:     He has no cervical adenopathy. Neurological: He is alert. He is not disoriented. Gait normal.   No obvious focal deficit. Skin: Skin is warm and dry. No rash noted. Psychiatric: He has a normal mood and affect. His behavior is normal.   Vitals reviewed.         Lab Results   Component Value Date    WBC 5.9 01/14/2019    HGB 13.8 (L) 01/14/2019    HCT 43.8 01/14/2019     01/14/2019    CHOL 247 (H) 02/12/2018    TRIG 100 02/12/2018    HDL 51 01/14/2019    ALT 21 01/14/2019    AST 23 01/14/2019     01/14/2019    K 4.3 01/14/2019     01/14/2019    CREATININE 0.7 01/14/2019    BUN 13 01/14/2019    CO2 24 01/14/2019    TSH 2.530 01/14/2019       Assessment/Plan:   1. Depression, unspecified depression type  Stable and well controlled overall.    - FLUoxetine (PROZAC) 40 MG capsule; Take 1 capsule by mouth daily  Dispense: 90 capsule; Refill: 1    2. Benign prostatic hyperplasia with lower urinary tract symptoms, symptom details unspecified  Improved on flomax. Will go for labs before follow-up. - tamsulosin (FLOMAX) 0.4 MG capsule; Take 1 capsule by mouth daily  Dispense: 90 capsule; Refill: 1    3. Low vitamin D level  Will re-check level. - Vitamin D 25 Hydroxy; Future    4. Hyperlipidemia, unspecified hyperlipidemia type  Will re-check lipids on statin. 5. Irritable bowel syndrome, unspecified type  Stable. 6. Vertigo  Intermittent. Neuroimaging normal.  Suspect benign positional vertigo. Will follow. To trial positional maneuvers if recurrent. 7. Memory change  Stable since last visit with normal labs and imaging. Mild symptoms (occasional difficulty with names or word recall that do not impact employment at this time). Certainly cannot exclude early dementia process. Offered neuro psych evaluation versus following clinically and prefers to follow. Will plan repeat mini mental in follow-up. 8. Tinnitus of both ears  Stable. Will trial supplement per ENT. 9. Rash and nonspecific skin eruption  Essentially resolved. 10. Viral URI  Improving spontaneously. Encouraged supportive care with rest, hydration, honey for cough. Tylenol and ibuprofen as needed. Return promptly if worsening or in 1-2 weeks if symptom persist.            Return in about 6 months (around 10/18/2019).     Orders Placed   Orders Placed This Encounter   Procedures    Vitamin D 25 Hydroxy     Standing Status:   Future     Standing Expiration Date:   4/18/2020       Prescriptions given/sent  Orders Placed This Encounter   Medications    tamsulosin (FLOMAX) 0.4 MG capsule     Sig: Take 1 capsule by mouth daily     Dispense:  90 capsule     Refill:  1    FLUoxetine (PROZAC) 40 MG capsule     Sig: Take 1 capsule by mouth daily     Dispense:  90 capsule     Refill:  1       Patient given educational materials - see patient instructions. Discussed use, benefit, and side effects of prescribed medications. All patientquestions answered. Pt voiced understanding. Reviewed health maintenance - to get shingles vaccine at pharmacy.               Electronically signed by Richard Matamoros MD on 4/18/2019 at 2:06 PM

## 2019-05-03 ENCOUNTER — HOSPITAL ENCOUNTER (OUTPATIENT)
Dept: PHYSICAL THERAPY | Age: 59
Setting detail: THERAPIES SERIES
Discharge: HOME | End: 2019-05-03
Payer: COMMERCIAL

## 2019-05-03 PROCEDURE — 97162 PT EVAL MOD COMPLEX 30 MIN: CPT

## 2019-05-03 PROCEDURE — 97530 THERAPEUTIC ACTIVITIES: CPT

## 2019-05-03 PROCEDURE — 95992 CANALITH REPOSITIONING PROC: CPT

## 2019-05-03 NOTE — PROGRESS NOTES
** PLEASE SIGN, DATE AND TIME CERTIFICATION BELOW AND RETURN TO [facility] OUTPATIENT REHABILITATION (FAX #: [phone]). ATTEST/CO-SIGN IF ACCESSING VIA [employer]. THANK YOU.**    I certify that I have examined the patient below and determined that Physical Medicine and Rehabilitation service is necessary and that I approve the established plan of care for up to 90 days or as specifically noted. Attestation, signature or co-signature of physician indicates approval of certification requirements.    ________________________ ____________ __________  Physician Signature   Date   Time        Rosmery     Time In: 1030  Time Out: 1120  Minutes: 50  Timed Code Treatment Minutes: 23 Minutes                Date: 5/3/2019  Patient Name: Cathy Sweeney,  Gender:  male        CSN: [id number]   : 1960  (60 y.o.)        Referring Practitioner: Alma Henley MD      Diagnosis: R42 (ICD-10-CM) - Dizziness and giddiness  Treatment Diagnosis: BPPV and cervicogenic dizziness   Additional Pertinent Hx: comorbidities: asymmetrical hearing loss, vertigo, tinnitus both ears, depression,  MRI brain normal        General:  PT Visit Information  Onset Date: 19  PT Insurance Information: Danielle [employer]/BS 25 visits covered per contract year from  to . aquatic yes modalities yes, no precertification. Total # of Visits Approved: 25  Total # of Visits to Date: 1  Plan of Care/Certification Expiration Date: 19  Progress Note Counter: 1/10 for PN                         See Medical History Questionnaire for information related to allergies and medications. Subjective:  Family / Caregiver Present: No  Comments: Follow up with Dr. Fabiola Avila no further follow up scheduled. Follows with Dr. Mile Golden PCP, in 6 months. Subjective: Patient reports of balance issues, dizziness and vertigo for last 3 years.  Notes vertigo present when he is laying down but does not happen frequently if even every few months. The dizziness he can have several times per week 3-4x per week. At present feeling woozy during evaluation. Ringing in ears has been present also for past 3 years which has gotten worse. Hearing loss is worse as well. Recently has had almost falls in which he has had to catch himself. Notes also history of neck pain and occasional headache 1-2x per week. Stress at neck. 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. Leisure & Hobbies: read, walk, ride bike    Objective                     Hearing: (ringing in bilateral ears.  )      Observation/Palpation  Posture: Fair  Observation: Note forward neck and shoulder posture,   Note patient also looks through glasses with neck upper cervical in more extension. Spine: cervical ROM:  rotation left 65, right rotation 50 degrees, lateral flexion right/left 23-25 degrees,  flexion 1 1/2 inches chin from chest, extension WNLS. Special Tests: modified vertebral artery test right/left normal,  Oculomotor exam : normal with smooth pursuit, saccades, VORslow and rapid normal.  Right Elwood Hallpike negative, left Micki hallpike patient reported of symptoms of vertigo unsustained for 10 seconds. Noted no nystagmus.                              Balance  Sitting - Static: Good  Sitting - Dynamic: Good  Standing - Static: Good  Standing - Dynamic: Fair  Tandem Stance R Le  Tandem Stance L Le  Single Leg Stance R Leg: 38  Single Leg Stance L Le           Exercises  Exercise 1: Slight positive left Sha Portillo with patient reporting of symptoms of unsustained vertigo less than 10 seconds. Treated with Epley maneuver for left. Exercise 2: patient explained BPPV symptoms and vertigo. He also was issued instruction for 24 hour restrictions Do not lay flat and no vertical movements of neck looking up/down. Exercise 3: instruction in posture/position awareness in sitting and standing. Sit with use of lumbar roll to correct sitting posture to decrease stress at upper cervical region and promote more normal alignment. Activity Tolerance:  Activity Tolerance: Patient Tolerated treatment well  Activity Tolerance: Note symptoms of vertigo with left Sha Portillo. Note also possibility of dizziness coming from upper cervical .      Assessment: Body structures, Functions, Activity limitations: Decreased ROM, Decreased balance, Vestibular Impairment  Assessment: 60year old male referred to PT for vertigo, dizziness and ringing in ears. He presents with possibility of Cervicogenic dizziness as well as BPPV symptoms with positive left Sha Portillo. Treated with Epley maneuver this session. Also discussed posture/position awareness to decrease stress on neck and upper cervical region. Will follow 1-2x per week to address deficits. Prognosis: Good  Discharge Recommendations: Continue to assess pending progress    Patient Education:  Patient Education: Patient educated in plan of care. Instructed in 24 hour restrictions following Epley Maneuver. Also posture and position awareness. Plan:  Times per week: 1-2x per week   Plan weeks: 8 weeks  Specific instructions for Next Treatment: Reassess for BPPV with left Sha Portillo,  Treat cervicogenic symptoms first to manage dizziness. Daniel technique for manual therapy:  for Upper cervical mobilization to treat dizziness symptoms.   posture and position awareness. Current Treatment Recommendations: ROM, Manual Therapy - Joint Manipulation, Manual Therapy - Soft Tissue Mobilization, Balance Training, Patient/Caregiver Education & Training, Vestibular Rehab    History: Personal factors or comorbidities that impact plan of care -  Moderate Complexity: 1-2 personal factors or comorbidities. See history section above for details. Examination: Body structures and functions, activity limitations, participation restrictions; using standardized tests and measures - Moderate Complexity: 3 or morebody structures and functional, activity limitations and/or participation restrictions. See restrictions and objective section above for details. Clinical Presentation: Moderate - Evolving with Changing Characteristics: vertigo symptoms of BPPV along with upper cervical issues with cervicogenic dizziness. Decision Making: Moderate Complexity due to see above for explanations. Decision making was based on patient assessment and decision making process in determining plan of care and establishing reasonable expectations for measurable functional outcomes. Evaluation Complexity: Based on the findings of patient history, examination, clinical presentation, and decision making during this evaluation, the evaluation of Grace Haney  is of medium complexity. Goals:  Patient goals : Patient would like to tolerate his moderate ex routine without dizziness. Notes more stable on feet with walking. Get rid of vertigo before he gets out of bed. Short term goals  Time Frame for Short term goals: 4 weeks  Short term goal 1: Patient to report of no vertigo symptoms with left Prabhu Kajal and getting out of bed in AM.    Short term goal 2: Patient to demonstrate improved left and right neck rotation at neck painfree with left upper cervical rotation and rotation to right and left to 70-80 degrees with increased ease with movement of neck when driving.    Short term goal 3: Patient to demonstrate increased balance with eyes closed in narrow stance without sway, SLS 45 seconds with increased steadiness reported with ambulation. Short term goal 4: Patient to demonstrate increased posture and position awareness with less neck pain and improved alignment. Long term goals  Time Frame for Long term goals : 8 weeks   Long term goal 1: Dizziness Handicap Inventory from 16/100 to 2/100   Long term goal 2: Patient independent in home ex program to manage cervicogenic dizziness /dysfunction in order to meet above goals 2-5.       [facility], [address] [facility]

## 2019-05-08 ENCOUNTER — HOSPITAL ENCOUNTER (OUTPATIENT)
Dept: PHYSICAL THERAPY | Age: 59
Setting detail: THERAPIES SERIES
Discharge: HOME | End: 2019-05-08
Payer: COMMERCIAL

## 2019-05-08 PROCEDURE — 97124 MASSAGE THERAPY: CPT

## 2019-05-08 PROCEDURE — 97140 MANUAL THERAPY 1/> REGIONS: CPT

## 2019-05-08 ASSESSMENT — PAIN DESCRIPTION - ORIENTATION: ORIENTATION: RIGHT;LEFT

## 2019-05-08 ASSESSMENT — PAIN DESCRIPTION - LOCATION: LOCATION: NECK

## 2019-05-08 ASSESSMENT — PAIN DESCRIPTION - PAIN TYPE: TYPE: CHRONIC PAIN

## 2019-05-08 ASSESSMENT — PAIN SCALES - GENERAL: PAINLEVEL_OUTOF10: 5

## 2019-05-08 NOTE — PROGRESS NOTES
[address] [facility]     Time In: 5599  Time Out: [address]  Minutes: 32  Timed Code Treatment Minutes: 32 Minutes                Date: 2019  Patient Name: Belem Greenfield,  Gender:  male        CSN: [id number]   : 1960  (60 y.o.)       Referring Practitioner: Eleanor Troy MD      Diagnosis: R42 (ICD-10-CM) - Dizziness and giddiness  Treatment Diagnosis: BPPV and cervicogenic dizziness    Additional Pertinent Hx: comorbidities: asymmetrical hearing loss, vertigo, tinnitus both ears, depression,  MRI brain normal                   General:  PT Visit Information  Onset Date: 19  PT Insurance Information: [place] [employer]/BS 25 visits covered per contract year from  to . aquatic yes modalities yes, no precertification. Total # of Visits Approved: 25  Total # of Visits to Date: 2  Plan of Care/Certification Expiration Date: 19  Progress Note Counter: 2/10 for PN                Subjective:  Family / Caregiver Present: No  Comments: Follow up with Dr. Christina Clifton no further follow up scheduled. Follows with Dr. Sandra Pina PCP, in 6 months. Subjective: Patient feels balance may be a little bit better but still feels off with occasional walking. Has not had episodes of spinning when laying down since last PT session. Neck pain level 5/10 left >right upper cervical region. Patient did have glasses adjusted and is watching his posture more. Pain:  Patient Currently in Pain: Yes  Pain Assessment: 0-10  Pain Level: 5  Pain Type: Chronic pain  Pain Location: Neck  Pain Orientation: Right, Left  Pain Radiating Towards: upper cervical region bilaterally left >right. Objective            Exercises  Exercise 3: instruction in posture/position awareness in sitting and standing. Sit with use of lumbar roll to correct sitting posture to decrease stress at upper cervical region and promote more normal alignment.     Exercise 4: Reverse NAGS seated at T2, T1, C7, C6 for 6 reps each. Exercise 5: neck retraction x10   Exercise 6: pec stretch in doorway 3x hold 15 seconds. Exercise 7: C2 SNAG 3x hold 10 seconds  for headache and lightheadedness. Exercise 8: manual therapy with soft tissue myofascial release and massage to bilateral cervical upper cervical and suboccipital region x8 minutes  Exercise 9: supine suboccipital releases x5 minutes  Exercise 10: Upper cervical traction x10 hold for 10 seconds. Activity Tolerance:  Activity Tolerance: Patient Tolerated treatment well  Activity Tolerance: Note no positional vertigo today with negative left [employer]. Note 1/10 symptoms of pain at upper neck and tightness reduced from 5/10 by end of session. Assessment: Body structures, Functions, Activity limitations: Decreased ROM, Decreased balance, Vestibular Impairment  Assessment: Initiated upper and lower cervical management for cervicalgia and cervicogenic dizziness, Headache/dizziness SNAG at C2 was completed which abolished dizziness and Reverse NAG completed at lower cervical region. Progressed with pec stretch, posture awareness in session. Also upper cervical flexion was added, supine upper cervical traction and suboccipital release. Negative testing for BPPV with left [employer] today. Prognosis: Good  Discharge Recommendations: Continue to assess pending progress    Patient Education:  Patient Education: Issued home ex program with neck retraction, chin tuck , pec stretch. posture re education                       Plan:  Times per week: 1-2x per week   Plan weeks: 8 weeks  Specific instructions for Next Treatment: Reassess for BPPV with left [employer],  Treat cervicogenic symptoms first to manage dizziness. Daniel technique for manual therapy:  for Upper cervical mobilization to treat dizziness symptoms. posture and position awareness.    Current Treatment Recommendations: ROM, Manual Therapy - Joint

## 2019-05-10 ENCOUNTER — HOSPITAL ENCOUNTER (OUTPATIENT)
Dept: PHYSICAL THERAPY | Age: 59
Setting detail: THERAPIES SERIES
Discharge: HOME | End: 2019-05-10
Payer: COMMERCIAL

## 2019-05-10 PROCEDURE — 97110 THERAPEUTIC EXERCISES: CPT

## 2019-05-10 PROCEDURE — 97140 MANUAL THERAPY 1/> REGIONS: CPT

## 2019-05-10 ASSESSMENT — PAIN DESCRIPTION - PAIN TYPE: TYPE: CHRONIC PAIN

## 2019-05-10 ASSESSMENT — PAIN DESCRIPTION - LOCATION: LOCATION: NECK

## 2019-05-10 ASSESSMENT — PAIN DESCRIPTION - ORIENTATION: ORIENTATION: RIGHT;LEFT

## 2019-05-10 ASSESSMENT — PAIN SCALES - GENERAL: PAINLEVEL_OUTOF10: 1

## 2019-05-10 NOTE — PROGRESS NOTES
[place]     Time In: 1300  Time Out: 1335  Minutes: 35  Timed Code Treatment Minutes: 35 Minutes                Date: 5/10/2019  Patient Name: Nancy Powell,  Gender:  male        CSN: [id number]   : 1960  (60 y.o.)       Referring Practitioner: Esteban Lovett MD      Diagnosis: R42 (ICD-10-CM) - Dizziness and giddiness  Treatment Diagnosis: BPPV and cervicogenic dizziness    Additional Pertinent Hx: comorbidities: asymmetrical hearing loss, vertigo, tinnitus both ears, depression,  MRI brain normal                   General:  PT Visit Information  Onset Date: 19  PT Insurance Information: [employer] BC/BS 25 visits covered per contract year from  to . aquatic yes modalities yes, no precertification. Total # of Visits Approved: 25  Total # of Visits to Date: 3  Plan of Care/Certification Expiration Date: 19  Progress Note Counter: 3/10 for PN                Subjective:  Family / Caregiver Present: No  Comments: Follow up with Dr. Ivan Herrera no further follow up scheduled. Follows with Dr. Amna Fitzpatrick PCP, in 6 months. Subjective: Patient reports that he feels more balanced and steady when walking. Notes no headache or than some sinus discomfort at forehead due to cold congestion. Denies of having any spinning or vertigo symptoms. Pain:  Patient Currently in Pain: Yes  Pain Assessment: 0-10  Pain Level: 1  Pain Type: Chronic pain  Pain Location: Neck  Pain Orientation: Right, Left      Objective                 Exercises  Exercise 3: instruction in posture/position awareness in sitting and standing. Sit with use of lumbar roll to correct sitting posture to decrease stress at upper cervical region and promote more normal alignment. Exercise 4: Reverse NAGS seated at T2, T1, C7, C6 for 6 reps each.     Exercise 5: neck retraction x 5,  neck retraction with patient overpressure x10    Exercise 6: with left Loreli Dangelo and getting out of bed in AM.    Short term goal 2: Patient to demonstrate improved left and right neck rotation at neck painfree with left upper cervical rotation and rotation to right and left to 70-80 degrees with increased ease with movement of neck when driving. Short term goal 3: Patient to demonstrate increased balance with eyes closed in narrow stance without sway, SLS 45 seconds with increased steadiness reported with ambulation. Short term goal 4: Patient to demonstrate increased posture and position awareness with less neck pain and improved alignment. Long term goals  Time Frame for Long term goals : 8 weeks   Long term goal 1: Dizziness Handicap Inventory from 16/100 to 2/100   Long term goal 2: Patient independent in home ex program to manage cervicogenic dizziness /dysfunction in order to meet above goals 2-5.       Michael Milligan, [address] [facility]

## 2019-05-14 ENCOUNTER — APPOINTMENT (OUTPATIENT)
Dept: PHYSICAL THERAPY | Age: 59
End: 2019-05-14
Payer: COMMERCIAL

## 2019-05-17 ENCOUNTER — HOSPITAL ENCOUNTER (OUTPATIENT)
Dept: PHYSICAL THERAPY | Age: 59
Setting detail: THERAPIES SERIES
Discharge: HOME | End: 2019-05-17
Payer: COMMERCIAL

## 2019-05-17 PROCEDURE — 97110 THERAPEUTIC EXERCISES: CPT

## 2019-05-17 PROCEDURE — 97140 MANUAL THERAPY 1/> REGIONS: CPT

## 2019-05-17 ASSESSMENT — PAIN DESCRIPTION - LOCATION: LOCATION: NECK

## 2019-05-17 ASSESSMENT — PAIN SCALES - GENERAL: PAINLEVEL_OUTOF10: 3

## 2019-05-17 ASSESSMENT — PAIN DESCRIPTION - ORIENTATION: ORIENTATION: RIGHT;LEFT

## 2019-05-17 ASSESSMENT — PAIN DESCRIPTION - PAIN TYPE: TYPE: CHRONIC PAIN

## 2019-05-17 NOTE — PROGRESS NOTES
[place]     Time In: 611  Time Out: 0845  Minutes: 38  Timed Code Treatment Minutes: 45 Minutes                Date: 2019  Patient Name: Gideon Mcdonnell,  Gender:  male        CSN: [id number]   : 1960  (60 y.o.)       Referring Practitioner: Alphonse Rogers MD      Diagnosis: R42 (ICD-10-CM) - Dizziness and giddiness  Treatment Diagnosis: BPPV and cervicogenic dizziness    Additional Pertinent Hx: comorbidities: asymmetrical hearing loss, vertigo, tinnitus both ears, depression,  MRI brain normal                   General:  PT Visit Information  Onset Date: 19  PT Insurance Information: [place] [employer] 25 visits covered per contract year from  to . aquatic yes modalities yes, no precertification. Total # of Visits Approved: 25  Total # of Visits to Date: 4  Plan of Care/Certification Expiration Date: 19  Progress Note Counter: 4/10 for PN                Subjective:  Family / Caregiver Present: No  Comments: Follow up with Dr. Wilber Carranza no further follow up scheduled. Follows with Dr. Breana Cooney PCP, in 6 months. Subjective: Patient reports that he is more aware of his posture/position in sitting. Exploring different chairs. Patient checking out his desk height and notes that computer is eye level. Feels lightheadedness and dizziness is pretty well clear. Feels more steady when he is walking. Pain:  Patient Currently in Pain: Yes  Pain Assessment: 0-10  Pain Level: 3  Pain Type: Chronic pain  Pain Location: Neck  Pain Orientation: Right, Left      Objective                Exercises  Exercise 3: instruction in posture/position awareness in sitting and standing. Sit with use of lumbar roll to correct sitting posture to decrease stress at upper cervical region and promote more normal alignment.     Exercise 4: Beginning of session rotation ROM right /left 70 to 73 degrees   Exercise 5: neck retraction x 5,  neck retraction with patient overpressure x10  , Seated Reverse NAG T1, C7 x6 reps each, then repeated neck retractions with overpressure. x7.   Exercise 6: pec stretch in doorway 3x hold 15 seconds. Exercise 7: cervical stabilization ex with 6 inch diameter ball behind head: shoulder flexion, abduction, horizontal abduction, shoulder extension x10 reps each. Exercise 8: posterior shoulder girdle strengthening: 3 ways rows, shoulder extension and horizontal abduction with peach band. Exercise 9: supine suboccipital releases x5 minutes  Exercise 10: Upper cervical traction x10 hold for 10 seconds. Exercise 11: Manual lower cervical traction: 7x hold 30 seconds  Exercise 12: End of session neck rotation 78-80 degrees. 0/10 pain level. Activity Tolerance:  Activity Tolerance: Patient Tolerated treatment well  Activity Tolerance: Note 0/10 pain level at end of session. Denies of vertigo or lightheadedness. ROM in rotation improving    Assessment: Body structures, Functions, Activity limitations: Decreased ROM, Decreased balance, Vestibular Impairment  Assessment: ROM at end of session 78-80 degrees. 0/10 pain level, Able to progress in cervical stabilization ex and posterior shoulder girdle strengthening. No lightheadedness or dizziness now. Steady gait reported. Prognosis: Good  Discharge Recommendations: Continue to assess pending progress    Patient Education:  Patient Education: Issued home ex program with neck retraction, chin tuck , pec stretch. posture re education , cervical stabilization ex, posterior shoulder girdle strengthening                      Plan:  Times per week: 1-2x per week   Plan weeks: 8 weeks  Specific instructions for Next Treatment: Reassess for BPPV with left Diamante Putnam,  Treat cervicogenic symptoms first to manage dizziness. Daniel technique for manual therapy:  for Upper cervical mobilization to treat dizziness symptoms.   posture and position awareness. Current Treatment Recommendations: ROM, Manual Therapy - Joint Manipulation, Manual Therapy - Soft Tissue Mobilization, Balance Training, Patient/Caregiver Education & Training, Vestibular Rehab    Goals:  Patient goals : Patient would like to tolerate his moderate ex routine without dizziness. Notes more stable on feet with walking. Get rid of vertigo before he gets out of bed. Short term goals  Time Frame for Short term goals: 4 weeks  Short term goal 1: Patient to report of no vertigo symptoms with left Rondi Peek and getting out of bed in AM.    Short term goal 2: Patient to demonstrate improved left and right neck rotation at neck painfree with left upper cervical rotation and rotation to right and left to 70-80 degrees with increased ease with movement of neck when driving. Short term goal 3: Patient to demonstrate increased balance with eyes closed in narrow stance without sway, SLS 45 seconds with increased steadiness reported with ambulation. Short term goal 4: Patient to demonstrate increased posture and position awareness with less neck pain and improved alignment. Long term goals  Time Frame for Long term goals : 8 weeks   Long term goal 1: Dizziness Handicap Inventory from 16/100 to 2/100   Long term goal 2: Patient independent in home ex program to manage cervicogenic dizziness /dysfunction in order to meet above goals 2-5.       Rochel Ganser, [address] [facility]

## 2019-05-21 ENCOUNTER — HOSPITAL ENCOUNTER (OUTPATIENT)
Dept: PHYSICAL THERAPY | Age: 59
Setting detail: THERAPIES SERIES
Discharge: HOME | End: 2019-05-21
Payer: COMMERCIAL

## 2019-05-21 PROCEDURE — 97110 THERAPEUTIC EXERCISES: CPT

## 2019-05-21 ASSESSMENT — PAIN SCALES - GENERAL: PAINLEVEL_OUTOF10: 0

## 2019-05-21 NOTE — DISCHARGE SUMMARY
[place]     Time In: 6321  Time Out: [address]  Minutes: 23  Timed Code Treatment Minutes: 23 Minutes                Date: 2019  Patient Name: Garcia Stallings,  Gender:  male        CSN: [id number]   : 1960  (60 y.o.)       Referring Practitioner: Michael Landa MD      Diagnosis: R42 (ICD-10-CM) - Dizziness and giddiness  Treatment Diagnosis: BPPV and cervicogenic dizziness    Additional Pertinent Hx: comorbidities: asymmetrical hearing loss, vertigo, tinnitus both ears, depression,  MRI brain normal                   General:  PT Visit Information  Onset Date: 19  PT Insurance Information: [place] [employer] 25 visits covered per contract year from  to . aquatic yes modalities yes, no precertification. Total # of Visits Approved: 25  Total # of Visits to Date: 5  Plan of Care/Certification Expiration Date: 19  Progress Note Counter:   for PN                Subjective:  Family / Caregiver Present: No  Comments: Follow up with Dr. Lui Croft no further follow up scheduled. Follows with Dr. Wellington Gil PCP, in 6 months. Subjective: Patient states that he has had no dizziness or lightheadedness. Notes that he was stepping over something on floor and may have lost balance. But did not have any lightheadedness. . Notes less neck stiiffness. Patient states that he does his HEP and feels the ex are helpful. Pain:  Patient Currently in Pain: No  Pain Assessment: 0-10  Pain Level: 0      Objective        Exercises  Exercise 3: instruction in posture/position awareness in sitting and standing. Sit with use of lumbar roll to correct sitting posture to decrease stress at upper cervical region and promote more normal alignment.     Exercise 4: Beginning of session rotation ROM right /left 83 degrees left rotation right restricted 60 degrees Did right rotation SNAG at C6 x3   with OP x3  Exercise 5: neck retraction x 5,  neck retraction with patient overpressure x10  ,   Exercise 6: pec stretch in doorway 3x hold 15 seconds. Exercise 7: cervical stabilization ex with 6 inch diameter ball behind head: shoulder flexion, abduction, horizontal abduction, shoulder extension x10 reps each. Exercise 8: posterior shoulder girdle strengthening: 3 ways rows, shoulder extension and horizontal abduction with peach band. Exercise 12: End of session neck rotation 83 degrees. 0/10 pain level. Exercise 13: Reassessment 5/21/19  Discharge from PT. GOALs MET. Activity Tolerance:  Activity Tolerance: Discharge Note. Patient independent in HEP and able to demonstrate ex. Negative for vertigo symptoms and dizziness. Assessment:  Assessment: Reassessment refer to goal summary for status. GOALS MEt with exception of dizziness handicap inventory. Even though patient reporting of no symptoms of dizziness or vertigo in past 4 weeks. [place] Hall[place] negative. No cervicogenic dizziness or lightheadedness either. Cervical ROM WNLS. Discharge due to independence in HEP and meeting goals. Patient Education:  Patient Education: Continue HEP as instructed in sessions                      Plan:  Plan Comment: Discharge from PT due to meeting goals and independence in HEP. Goals:  Patient goals : Patient would like to tolerate his moderate ex routine without dizziness. Notes more stable on feet with walking. Get rid of vertigo before he gets out of bed. GOAL MET Patient reports of no vertigo and notes more steadiness with walking. Short term goals  Time Frame for Short term goals: 4 weeks  Short term goal 1: Patient to report of no vertigo symptoms with left Sandrea Marts and getting out of bed in AM.  GOAL MET Patient has negative left Sandrea Marts.     Short term goal 2: Patient to demonstrate improved left and right neck rotation at neck painfree with left upper cervical rotation and rotation to right and left to 70-80 degrees with increased ease with movement of neck when driving. GOAL MET Left Rotation WNLS to 83 degrees. Short term goal 3: Patient to demonstrate increased balance with eyes closed in narrow stance without sway, SLS 45 seconds with increased steadiness reported with ambulation. GOAL MET Note nil sway after 2 seconds and then maintaining for 15 seconds testing without sway. SLS 15seconds. TAndem stance >45 seconds. Short term goal 4: Patient to demonstrate increased posture and position awareness with less neck pain and improved alignment. GOAL MET Note improved position and posture awareness. Good follow through noted. Long term goals  Time Frame for Long term goals : 8 weeks  5/21/19    Long term goal 1: Dizziness Handicap Inventory from 16/100 to 2/100  GOAL NOT MET 12/100   Long term goal 2: Patient independent in home ex program to manage cervicogenic dizziness /dysfunction in order to meet above goals 2-5. GOAL MET Iindependent in [facility].      Job Haugen, [address] [facility]

## 2019-06-26 ENCOUNTER — NURSE ONLY (OUTPATIENT)
Dept: FAMILY MEDICINE CLINIC | Age: 59
End: 2019-06-26
Payer: COMMERCIAL

## 2019-06-26 DIAGNOSIS — E78.5 HYPERLIPIDEMIA, UNSPECIFIED HYPERLIPIDEMIA TYPE: ICD-10-CM

## 2019-06-26 DIAGNOSIS — R79.89 LOW VITAMIN D LEVEL: ICD-10-CM

## 2019-06-26 DIAGNOSIS — N40.1 BENIGN PROSTATIC HYPERPLASIA WITH LOWER URINARY TRACT SYMPTOMS, SYMPTOM DETAILS UNSPECIFIED: ICD-10-CM

## 2019-06-26 LAB
CHOLESTEROL, FASTING: 163 MG/DL (ref 100–199)
HDLC SERPL-MCNC: 51 MG/DL
LDL CHOLESTEROL CALCULATED: 96 MG/DL
PROSTATE SPECIFIC ANTIGEN: 0.93 NG/ML (ref 0–1)
TRIGLYCERIDE, FASTING: 79 MG/DL (ref 0–199)
VITAMIN D 25-HYDROXY: 35 NG/ML (ref 30–100)

## 2019-06-26 PROCEDURE — 36415 COLL VENOUS BLD VENIPUNCTURE: CPT | Performed by: FAMILY MEDICINE

## 2019-10-21 ENCOUNTER — OFFICE VISIT (OUTPATIENT)
Dept: FAMILY MEDICINE CLINIC | Age: 59
End: 2019-10-21
Payer: COMMERCIAL

## 2019-10-21 VITALS
RESPIRATION RATE: 16 BRPM | HEART RATE: 81 BPM | SYSTOLIC BLOOD PRESSURE: 98 MMHG | DIASTOLIC BLOOD PRESSURE: 62 MMHG | WEIGHT: 166 LBS | OXYGEN SATURATION: 96 % | BODY MASS INDEX: 26.68 KG/M2 | HEIGHT: 66 IN

## 2019-10-21 DIAGNOSIS — K58.9 IRRITABLE BOWEL SYNDROME, UNSPECIFIED TYPE: Primary | ICD-10-CM

## 2019-10-21 DIAGNOSIS — N40.1 BENIGN PROSTATIC HYPERPLASIA WITH LOWER URINARY TRACT SYMPTOMS, SYMPTOM DETAILS UNSPECIFIED: ICD-10-CM

## 2019-10-21 DIAGNOSIS — F32.A DEPRESSION, UNSPECIFIED DEPRESSION TYPE: ICD-10-CM

## 2019-10-21 DIAGNOSIS — R41.3 MEMORY CHANGES: ICD-10-CM

## 2019-10-21 DIAGNOSIS — Z23 NEED FOR INFLUENZA VACCINATION: ICD-10-CM

## 2019-10-21 DIAGNOSIS — R79.89 LOW SERUM VITAMIN D: ICD-10-CM

## 2019-10-21 DIAGNOSIS — E78.5 HYPERLIPIDEMIA, UNSPECIFIED HYPERLIPIDEMIA TYPE: ICD-10-CM

## 2019-10-21 PROCEDURE — 90686 IIV4 VACC NO PRSV 0.5 ML IM: CPT | Performed by: FAMILY MEDICINE

## 2019-10-21 PROCEDURE — 90471 IMMUNIZATION ADMIN: CPT | Performed by: FAMILY MEDICINE

## 2019-10-21 PROCEDURE — 99214 OFFICE O/P EST MOD 30 MIN: CPT | Performed by: FAMILY MEDICINE

## 2019-10-21 RX ORDER — ATORVASTATIN CALCIUM 40 MG/1
40 TABLET, FILM COATED ORAL DAILY
Qty: 90 TABLET | Refills: 1 | Status: SHIPPED | OUTPATIENT
Start: 2019-10-21 | End: 2020-04-23 | Stop reason: SDUPTHER

## 2019-10-21 ASSESSMENT — ENCOUNTER SYMPTOMS
COUGH: 0
VOMITING: 0
CONSTIPATION: 0
RHINORRHEA: 0
TROUBLE SWALLOWING: 0
NAUSEA: 0
ABDOMINAL PAIN: 0
SHORTNESS OF BREATH: 0
SORE THROAT: 0
EYE PAIN: 0
DIARRHEA: 0

## 2020-02-12 ENCOUNTER — TELEPHONE (OUTPATIENT)
Dept: FAMILY MEDICINE CLINIC | Age: 60
End: 2020-02-12

## 2020-02-12 RX ORDER — OSELTAMIVIR PHOSPHATE 75 MG/1
75 CAPSULE ORAL 2 TIMES DAILY
Qty: 10 CAPSULE | Refills: 0 | Status: SHIPPED | OUTPATIENT
Start: 2020-02-12 | End: 2020-02-17

## 2020-03-09 RX ORDER — TAMSULOSIN HYDROCHLORIDE 0.4 MG/1
CAPSULE ORAL
Qty: 90 CAPSULE | Refills: 1 | Status: SHIPPED | OUTPATIENT
Start: 2020-03-09 | End: 2020-12-03

## 2020-03-30 ENCOUNTER — TELEPHONE (OUTPATIENT)
Dept: FAMILY MEDICINE CLINIC | Age: 60
End: 2020-03-30

## 2020-03-30 NOTE — TELEPHONE ENCOUNTER
I recommend physical distancing given COVID-19 with only essential interactions in person. As such we are attempting to handle all visits where in person contact is not essential via phone and our physical office location in [place] is currently closed. Please call patient. If patient is amenable and does not have acute concerns that can only be addressed in person, please convert upcoming visit to a video visit via haiku or doxy. me or to a phone visit if patient is unable to use one of these platforms. It is preferable to move this appointment to a sooner available virtual slot if patient is amenable but it is also fine to leave virtual appointment at the existing time if preferred. If acute needs that the patient believes can only be addressed in person, please send me a message (okay to call me at [phone] if urgent) and I will call the patient to make a plan. I am happy to speak directly with the patient there are urgent questions or concerns or if patient were to become ill in the coming weeks; during usual business hours the patient should contact the office at [phone]. I can be reached after usual business hours by calling the office at [phone] and pressing the option to urgently speak with the doctor on call or via cell phone at [phone].

## 2020-04-06 RX ORDER — FLUOXETINE HYDROCHLORIDE 40 MG/1
CAPSULE ORAL
Qty: 90 CAPSULE | Refills: 1 | Status: SHIPPED | OUTPATIENT
Start: 2020-04-06 | End: 2020-04-23 | Stop reason: SDUPTHER

## 2020-04-23 ENCOUNTER — TELEMEDICINE (OUTPATIENT)
Dept: FAMILY MEDICINE CLINIC | Age: 60
End: 2020-04-23
Payer: COMMERCIAL

## 2020-04-23 PROCEDURE — 99214 OFFICE O/P EST MOD 30 MIN: CPT | Performed by: FAMILY MEDICINE

## 2020-04-23 RX ORDER — FLUOXETINE HYDROCHLORIDE 40 MG/1
40 CAPSULE ORAL DAILY
Qty: 90 CAPSULE | Refills: 1 | Status: SHIPPED | OUTPATIENT
Start: 2020-04-23 | End: 2020-12-07 | Stop reason: SDUPTHER

## 2020-04-23 RX ORDER — ATORVASTATIN CALCIUM 40 MG/1
40 TABLET, FILM COATED ORAL DAILY
Qty: 90 TABLET | Refills: 1 | Status: SHIPPED | OUTPATIENT
Start: 2020-04-23 | End: 2020-12-07 | Stop reason: SDUPTHER

## 2020-04-23 ASSESSMENT — PATIENT HEALTH QUESTIONNAIRE - PHQ9
SUM OF ALL RESPONSES TO PHQ9 QUESTIONS 1 & 2: 2
1. LITTLE INTEREST OR PLEASURE IN DOING THINGS: 1
2. FEELING DOWN, DEPRESSED OR HOPELESS: 1
SUM OF ALL RESPONSES TO PHQ QUESTIONS 1-9: 2
SUM OF ALL RESPONSES TO PHQ QUESTIONS 1-9: 2

## 2020-04-23 ASSESSMENT — ENCOUNTER SYMPTOMS
TROUBLE SWALLOWING: 0
VOMITING: 0
CONSTIPATION: 0
ABDOMINAL PAIN: 0
NAUSEA: 0
SHORTNESS OF BREATH: 0
EYE PAIN: 0
DIARRHEA: 0
COUGH: 0
RHINORRHEA: 0
SORE THROAT: 0

## 2020-04-23 NOTE — PROGRESS NOTES
distress. Appearance: Normal appearance. He is not ill-appearing or toxic-appearing. HENT:      Head: Normocephalic and atraumatic. Nose: Nose normal.      Mouth/Throat:      Mouth: Mucous membranes are moist.   Eyes:      Conjunctiva/sclera: Conjunctivae normal.      Pupils: Pupils are equal, round, and reactive to light. Pulmonary:      Effort: Pulmonary effort is normal. No respiratory distress. Skin:     General: Skin is dry. Findings: No rash. Neurological:      Mental Status: He is alert. Psychiatric:         Attention and Perception: Attention normal.         Mood and Affect: Mood normal.         Speech: Speech normal.         Behavior: Behavior normal.         Thought Content: Thought content normal.         Cognition and Memory: Cognition normal.         Judgment: Judgment normal.     Photos of skin lesions reviewed. Right ear with light symmetric lesion with visible keratin plugs and no overlying telangectasias. Left face with raised stuck on lesion. Low back with stuck on lesion. Lab Results   Component Value Date    WBC 5.9 2019    HGB 13.8 (L) 2019    HCT 43.8 2019     2019    CHOL 247 (H) 2018    TRIG 100 2018    HDL 51 2019    ALT 21 2019    AST 23 2019     2019    K 4.3 2019     2019    CREATININE 0.7 2019    BUN 13 2019    CO2 24 2019    TSH 2.530 2019    PSA 0.93 2019       [place] Spearin. Depression, unspecified depression type  Stable. - FLUoxetine (PROZAC) 40 MG capsule; Take 1 capsule by mouth daily  Dispense: 90 capsule; Refill: 1    2. Hyperlipidemia, unspecified hyperlipidemia type  Stable. Occasional 1/2 grapefruit okay. - atorvastatin (LIPITOR) 40 MG tablet; Take 1 tablet by mouth daily  Dispense: 90 tablet; Refill: 1    3. Benign prostatic hyperplasia with lower urinary tract symptoms, symptom details unspecified  Stable.       4.

## 2020-12-03 RX ORDER — TAMSULOSIN HYDROCHLORIDE 0.4 MG/1
CAPSULE ORAL
Qty: 90 CAPSULE | Refills: 1 | Status: SHIPPED | OUTPATIENT
Start: 2020-12-03 | End: 2021-06-03

## 2020-12-07 ENCOUNTER — TELEPHONE (OUTPATIENT)
Dept: FAMILY MEDICINE CLINIC | Age: 60
End: 2020-12-07

## 2020-12-07 ENCOUNTER — OFFICE VISIT (OUTPATIENT)
Dept: FAMILY MEDICINE CLINIC | Age: 60
End: 2020-12-07
Payer: COMMERCIAL

## 2020-12-07 VITALS
SYSTOLIC BLOOD PRESSURE: 118 MMHG | DIASTOLIC BLOOD PRESSURE: 70 MMHG | BODY MASS INDEX: 26.47 KG/M2 | TEMPERATURE: 97.5 F | RESPIRATION RATE: 16 BRPM | HEART RATE: 78 BPM | WEIGHT: 164 LBS

## 2020-12-07 PROCEDURE — 99396 PREV VISIT EST AGE 40-64: CPT | Performed by: FAMILY MEDICINE

## 2020-12-07 PROCEDURE — 90750 HZV VACC RECOMBINANT IM: CPT | Performed by: FAMILY MEDICINE

## 2020-12-07 PROCEDURE — 99214 OFFICE O/P EST MOD 30 MIN: CPT | Performed by: FAMILY MEDICINE

## 2020-12-07 PROCEDURE — 90471 IMMUNIZATION ADMIN: CPT | Performed by: FAMILY MEDICINE

## 2020-12-07 RX ORDER — FLUOXETINE HYDROCHLORIDE 40 MG/1
40 CAPSULE ORAL DAILY
Qty: 90 CAPSULE | Refills: 1 | Status: SHIPPED | OUTPATIENT
Start: 2020-12-07 | End: 2021-08-13 | Stop reason: SDUPTHER

## 2020-12-07 RX ORDER — ATORVASTATIN CALCIUM 40 MG/1
40 TABLET, FILM COATED ORAL DAILY
Qty: 90 TABLET | Refills: 1 | Status: SHIPPED | OUTPATIENT
Start: 2020-12-07 | End: 2021-08-13 | Stop reason: SDUPTHER

## 2020-12-07 ASSESSMENT — ENCOUNTER SYMPTOMS
ABDOMINAL PAIN: 0
DIARRHEA: 0
SHORTNESS OF BREATH: 0
SORE THROAT: 0
WHEEZING: 0
EYE DISCHARGE: 0
EYE PAIN: 0
CONSTIPATION: 0

## 2020-12-07 NOTE — PROGRESS NOTES
Immunizations Administered     Name Date Dose Route    Zoster Recombinant (Shingrix) 12/7/2020 0.5 mL Intramuscular    Site: Deltoid- Left    Lot: [id number]    NDC: 98604-062-83          VIS GIVEN. CONSENT SIGNED  PATIENT TOLERATED WELL. ABN SIGNED.

## 2020-12-07 NOTE — PROGRESS NOTES
  FLUoxetine (PROZAC) 40 MG capsule, Take 1 capsule by mouth daily, Disp: 90 capsule, Rfl: 1    atorvastatin (LIPITOR) 40 MG tablet, Take 1 tablet by mouth daily, Disp: 90 tablet, Rfl: 1    tamsulosin (FLOMAX) 0.4 MG capsule, TAKE 1 CAPSULE BY MOUTH EVERY DAY, Disp: 90 capsule, Rfl: 1    Cholecalciferol (VITAMIN D) 2000 units CAPS capsule, Take by mouth daily, Disp: , Rfl:     DAILY MULTIPLE VITAMINS TABS, Take by mouth, Disp: , Rfl:     Digestive Enzyme CAPS, Take by mouth daily , Disp: , Rfl:     Lactobacillus (ACIDOPHILUS) 100 MG CAPS, Take by mouth, Disp: , Rfl:     Subjective:      Review of Systems   Constitutional: Negative for chills, fatigue and fever. HENT: Negative for congestion and sore throat. Eyes: Negative for pain and discharge. Respiratory: Negative for shortness of breath and wheezing. Cardiovascular: Negative for chest pain and palpitations. Gastrointestinal: Negative for abdominal pain, constipation and diarrhea. Genitourinary: Negative for dysuria and urgency. Skin: Negative for rash and wound. Neurological: Negative for dizziness, weakness and numbness. Psychiatric/Behavioral: Negative for agitation and dysphoric mood. Objective:     Vitals:    12/07/20 1710   BP: 118/70   Site: Left Upper Arm   Position: Sitting   Cuff Size: Large Adult   Pulse: 78   Resp: 16   Temp: 97.5 °F (36.4 °C)   TempSrc: Temporal   Weight: 164 lb (74.4 kg)       Physical Exam  Vitals signs and nursing note reviewed. Constitutional:       General: He is not in acute distress. HENT:      Head: Normocephalic and atraumatic. Right Ear: External ear normal.      Left Ear: External ear normal.      Nose: No congestion or rhinorrhea. Eyes:      General:         Right eye: No discharge. Left eye: No discharge. Neck:      Musculoskeletal: Normal range of motion. Cardiovascular:      Rate and Rhythm: Normal rate and regular rhythm.    Pulmonary:      Effort: Pulmonary prompt return and/or emergent presentation if worsening reviewed in detail. 4. Hyperlipidemia, unspecified hyperlipidemia type  Checking labs; refilled regimen.    - CBC With Auto Differential; Future  - Comprehensive Metabolic Panel; Future  - Lipid Panel; Future  - atorvastatin (LIPITOR) 40 MG tablet; Take 1 tablet by mouth daily  Dispense: 90 tablet; Refill: 1    5. Depression, unspecified depression type  Stable. - FLUoxetine (PROZAC) 40 MG capsule; Take 1 capsule by mouth daily  Dispense: 90 capsule; Refill: 1    6. Benign prostatic hyperplasia with lower urinary tract symptoms, symptom details unspecified  Stable symptoms. Will check PSA.    - PSA Prostatic Specific Antigen; Future    Will have staff repeat MMSE when patient comes in for labs to confirm stable. No change in symptoms related to memory at this time. Return in about 6 months (around 6/7/2021) for Follow up.     Orders Placed   Orders Placed This Encounter   Procedures    XR SHOULDER RIGHT (MIN 2 VIEWS)     Standing Status:   Future     Standing Expiration Date:   12/7/2021     Order Specific Question:   Reason for exam:     Answer:   Right shoulder pain    Zoster recombinant [facility])    CBC With Auto Differential     Standing Status:   Future     Standing Expiration Date:   12/7/2021    Comprehensive Metabolic Panel     Standing Status:   Future     Standing Expiration Date:   12/7/2021    Lipid Panel     Standing Status:   Future     Standing Expiration Date:   12/7/2021     Order Specific Question:   Is Patient Fasting?/# of Hours     Answer:   yes    PSA Prostatic Specific Antigen     Standing Status:   Future     Standing Expiration Date:   12/7/2021    POCT Fecal Immunochemical Test (FIT)     Standing Status:   Future     Standing Expiration Date:   12/7/2021    Type And Screen     Standing Status:   Future     Standing Expiration Date:   12/7/2021       Prescriptions given/sent  Orders Placed This Encounter

## 2020-12-07 NOTE — PATIENT INSTRUCTIONS
other hand, hold your injured arm (palm outward) behind your back by the wrist. Pull your arm up gently to stretch your shoulder. 3. Advanced stretch: Put a towel over your other shoulder. Put the hand of your injured arm behind your back. Now hold the back end of the towel. With the other hand, hold the front end of the towel in front of your body. Pull gently on the front end of the towel. This will bring your hand farther up your back to stretch your shoulder. Overhead stretch   1. Standing about an arm's length away, grasp onto a solid surface. You could use a countertop, a doorknob, or the back of a sturdy chair. 2. With your knees slightly bent, bend forward with your arms straight. Lower your upper body, and let your shoulders stretch. 3. As your shoulders are able to stretch farther, you may need to take a step or two backward. 4. Hold for at least 15 to 30 seconds. Then stand up and relax. If you had stepped back during your stretch, step forward so you can keep your hands on the solid surface. 5. Repeat 2 to 4 times. Shoulder flexion (lying down)   To make a wand for this exercise, use a piece of PVC pipe or a broom handle with the broom removed. Make the wand about a foot wider than your shoulders. 1. Lie on your back, holding a wand with both hands. Your palms should face down as you hold the wand. 2. Keeping your elbows straight, slowly raise your arms over your head. Raise them until you feel a stretch in your shoulders, upper back, and chest.  3. Hold for 15 to 30 seconds. 4. Repeat 2 to 4 times. Shoulder rotation (lying down)   To make a wand for this exercise, use a piece of PVC pipe or a broom handle with the broom removed. Make the wand about a foot wider than your shoulders. 1. Lie on your back. Hold a wand with both hands with your elbows bent and palms up. 2. Keep your elbows close to your body, and move the wand across your body toward the sore arm.   3. Hold for 8 to 12 seconds. 4. Repeat 2 to 4 times. Wall climbing (to the side)   Avoid any movement that is straight to your side, and be careful not to arch your back. Your arm should stay about 30 degrees to the front of your side. 1. Stand with your side to a wall so that your fingers can just touch it at an angle about 30 degrees toward the front of your body. 2. Walk the fingers of your injured arm up the wall as high as pain permits. Try not to shrug your shoulder up toward your ear as you move your arm up. 3. Hold that position for a count of at least 15 to 20.  4. Walk your fingers back down to the starting position. 5. Repeat at least 2 to 4 times. Try to reach higher each time. Wall climbing (to the front)   During this stretching exercise, be careful not to arch your back. 1. Face a wall, and stand so your fingers can just touch it. 2. Keeping your shoulder down, walk the fingers of your injured arm up the wall as high as pain permits. (Don't shrug your shoulder up toward your ear.)  3. Hold your arm in that position for at least 15 to 30 seconds. 4. Slowly walk your fingers back down to where you started. 5. Repeat at least 2 to 4 times. Try to reach higher each time. Shoulder blade squeeze   1. Stand with your arms at your sides, and squeeze your shoulder blades together. Do not raise your shoulders up as you squeeze. 2. Hold 6 seconds. 3. Repeat 8 to 12 times. Scapular exercise: Arm reach   1. Lie flat on your back. This exercise is a very slight motion that starts with your arms raised (elbows straight, arms straight). 2. From this position, reach higher toward the [place] or ceiling. Keep your elbows straight. All motion should be from your shoulder blade only. 3. Relax your arms back to where you started. 4. Repeat 8 to 12 times. Arm raise to the side   During this strengthening exercise, your arm should stay about 30 degrees to the front of your side.   1. Slowly raise your injured arm to the side, with your thumb facing up. Raise your arm 60 degrees at the most (shoulder level is 90 degrees). 2. Hold the position for 3 to 5 seconds. Then lower your arm back to your side. If you need to, bring your \"good\" arm across your body and place it under the elbow as you lower your injured arm. Use your good arm to keep your injured arm from dropping down too fast.  3. Repeat 8 to 12 times. 4. When you first start out, don't hold any extra weight in your hand. As you get stronger, you may use a 1-pound to 2-pound dumbbell or a small can of food. Shoulder flexor and extensor exercise   These are isometric exercises. That means you contract your muscles without actually moving. 1. Push forward (flex): Stand facing a wall or doorjamb, about 6 inches or less back. Hold your injured arm against your body. Make a closed fist with your thumb on top. Then gently push your hand forward into the wall with about 25% to 50% of your strength. Don't let your body move backward as you push. Hold for about 6 seconds. Relax for a few seconds. Repeat 8 to 12 times. 2. Push backward (extend): Stand with your back flat against a wall. Your upper arm should be against the wall, with your elbow bent 90 degrees (your hand straight ahead). Push your elbow gently back against the wall with about 25% to 50% of your strength. Don't let your body move forward as you push. Hold for about 6 seconds. Relax for a few seconds. Repeat 8 to 12 times. Scapular exercise: Wall push-ups   This exercise is best done with your fingers somewhat turned out, rather than straight up and down. 1. Stand facing a wall, about 12 inches to 18 inches away. 2. Place your hands on the wall at shoulder height. 3. Slowly bend your elbows and bring your face to the wall. Keep your back and hips straight. 4. Push back to where you started. 5. Repeat 8 to 12 times.   6. When you can do this exercise against a wall comfortably, you can try it against a counter. You can then slowly progress to the end of a couch, then to a sturdy chair, and finally to the floor. Scapular exercise: Retraction   For this exercise, you will need elastic exercise material, such as surgical tubing or Thera-Band. 1. Put the band around a solid object at about waist level. (A bedpost will work well.) Each hand should hold an end of the band. 2. With your elbows at your sides and bent to 90 degrees, pull the band back. Your shoulder blades should move toward each other. Then move your arms back where you started. 3. Repeat 8 to 12 times. 4. If you have good range of motion in your shoulders, try this exercise with your arms lifted out to the sides. Keep your elbows at a 90-degree angle. Raise the elastic band up to about shoulder level. Pull the band back to move your shoulder blades toward each other. Then move your arms back where you started. Internal rotator strengthening exercise   1. Start by tying a piece of elastic exercise material to a doorknob. You can use surgical tubing or Thera-Band. 2. Stand or sit with your shoulder relaxed and your elbow bent 90 degrees. Your upper arm should rest comfortably against your side. Squeeze a rolled towel between your elbow and your body for comfort. This will help keep your arm at your side. 3. Hold one end of the elastic band in the hand of the painful arm. 4. Slowly rotate your forearm toward your body until it touches your belly. Slowly move it back to where you started. 5. Keep your elbow and upper arm firmly tucked against the towel roll or at your side. 6. Repeat 8 to 12 times. External rotator strengthening exercise   1. Start by tying a piece of elastic exercise material to a doorknob. You can use surgical tubing or Thera-Band. (You may also hold one end of the band in each hand.)  2. Stand or sit with your shoulder relaxed and your elbow bent 90 degrees. Your upper arm should rest comfortably against your side. Squeeze a rolled towel between your elbow and your body for comfort. This will help keep your arm at your side. 3. Hold one end of the elastic band with the hand of the painful arm. 4. Start with your forearm across your belly. Slowly rotate the forearm out away from your body. Keep your elbow and upper arm tucked against the towel roll or the side of your body until you begin to feel tightness in your shoulder. Slowly move your arm back to where you started. 5. Repeat 8 to 12 times. Follow-up care is a key part of your treatment and safety. Be sure to make and go to all appointments, and call your doctor if you are having problems. It's also a good idea to know your test results and keep a list of the medicines you take. Where can you learn more? Go to https://chbernabeeb.[employer]Content. org and sign in to your [employer] account. Enter Jimenez Boyd in the Neocleus box to learn more about \"Rotator Cuff: Exercises. \"     If you do not have an account, please click on the \"Sign Up Now\" link. Current as of: March 2, 2020               Content Version: 12.6  © 1660-[address] [employer]. Care instructions adapted under license by [facility] ([facility]). If you have questions about a medical condition or this instruction, always ask your healthcare professional. [address] any warranty or liability for your use of this information.

## 2020-12-08 NOTE — PROGRESS NOTES
Ilnee Goel[address] [address]  Dept: [phone]  Dept Fax: [phone]  Loc: [phone]      Rudolph Merino is a 63 y.o. male who presents todayfor his medical conditions/complaints as noted below. Rudolph Merino is c/o of Annual Exam; Arm Pain (right arm pain started in July after taking a long bike ride in June ); and Finger Pain (pain in left ring and pinky finger x 2 months )      :     HPI     Here for annual exam. See below for other issues. Does report no recent GI bleeding. Known hemorrhoids and an occasional bright red smear after a large stool but not in the past several years. We did discuss that if ongoing bleeding would need a colonoscopy rather than a FIT test.      Patient Active Problem List   Diagnosis    Irritable bowel syndrome (IBS)    Hyperlipidemia    Depression    Actinic keratosis    Plantar wart of right foot    Asymmetrical hearing loss    Benign prostatic hyperplasia with lower urinary tract symptoms    Low serum vitamin D    Memory changes     Goals    None       The patient has No Known Allergies. Medical History  Amanda Vera has a past medical history of Depression, Hyperlipidemia, and Irritable bowel syndrome (IBS). Past SurgicalHistory  The patient  has a past surgical history that includes Appendectomy and Tonsillectomy. Family History  This patient's family history includes Alzheimer's Disease in his mother; Uterine Cancer in his sister. Social History  Amanda Vera  reports that he has never smoked. He has never used smokeless tobacco. He reports that he does not drink alcohol or use drugs.     Medications    Current Outpatient Medications:     FLUoxetine (PROZAC) 40 MG capsule, Take 1 capsule by mouth daily, Disp: 90 capsule, Rfl: 1    atorvastatin (LIPITOR) 40 MG tablet, Take 1 tablet by mouth daily, Disp: 90 tablet, Rfl: 1    tamsulosin (FLOMAX) 0.4 MG capsule, TAKE 1 CAPSULE BY MOUTH EVERY DAY, Disp: 90 capsule, Rfl: 1    Cholecalciferol (VITAMIN D) 2000 units CAPS capsule, Take by mouth daily, Disp: , Rfl:     DAILY MULTIPLE VITAMINS TABS, Take by mouth, Disp: , Rfl:     Digestive Enzyme CAPS, Take by mouth daily , Disp: , Rfl:     Lactobacillus (ACIDOPHILUS) 100 MG CAPS, Take by mouth, Disp: , Rfl:     Subjective:      Review of Systems   Constitutional: Negative for chills, fatigue and fever. HENT: Negative for congestion and sore throat. Eyes: Negative for pain and discharge. Respiratory: Negative for shortness of breath and wheezing. Cardiovascular: Negative for chest pain and palpitations. Gastrointestinal: Negative for abdominal pain, constipation and diarrhea. Genitourinary: Negative for dysuria and urgency. Skin: Negative for rash and wound. Neurological: Negative for dizziness, weakness and numbness. Psychiatric/Behavioral: Negative for agitation and dysphoric mood. Objective:     Vitals:    12/07/20 1710   BP: 118/70   Site: Left Upper Arm   Position: Sitting   Cuff Size: Large Adult   Pulse: 78   Resp: 16   Temp: 97.5 °F (36.4 °C)   TempSrc: Temporal   Weight: 164 lb (74.4 kg)       Physical Exam  Vitals signs and nursing note reviewed. Constitutional:       General: He is not in acute distress. HENT:      Head: Normocephalic and atraumatic. Right Ear: External ear normal.      Left Ear: External ear normal.      Nose: No congestion or rhinorrhea. Eyes:      General:         Right eye: No discharge. Left eye: No discharge. Neck:      Musculoskeletal: Normal range of motion. Cardiovascular:      Rate and Rhythm: Normal rate and regular rhythm. Pulmonary:      Effort: Pulmonary effort is normal.      Breath sounds: No wheezing. Abdominal:      General: Abdomen is flat. Palpations: Abdomen is soft. Tenderness: There is no abdominal tenderness. Hernia: A hernia (Small 1cm periumbilical defect;  I suspect herniated preperitoeal fat rather than bowel and it is entirely reducible. Mild abdominal wall diastasis) is present. Musculoskeletal: Normal range of motion. General: No swelling or deformity. Comments: Trigger finger in the left 5th digit of the hand. Mild discomfort in the right shoulder with extension against resistance and external rotation. No crepitus, mass, step off deformity appreciated. Skin:     General: Skin is warm. Findings: No rash. Neurological:      General: No focal deficit present. Mental Status: He is alert and oriented to person, place, and time. Psychiatric:         Mood and Affect: Mood normal.         Behavior: Behavior normal.       Lab Results   Component Value Date    WBC 5.9 01/14/2019    HGB 13.8 (L) 01/14/2019    HCT 43.8 01/14/2019     01/14/2019    CHOL 247 (H) 02/12/2018    TRIG 100 02/12/2018    HDL 51 06/26/2019    ALT 21 01/14/2019    AST 23 01/14/2019     01/14/2019    K 4.3 01/14/2019     01/14/2019    CREATININE 0.7 01/14/2019    BUN 13 01/14/2019    CO2 24 01/14/2019    TSH 2.530 01/14/2019    PSA 0.93 06/26/2019       Mathieugus Fany:   1. Visit for well man health check  Well check today. 2. Need for shingles vaccine  Given.    - Zoster recombinant [facility])    3. Need for vaccination  As above. 4. Screen for colon cancer  As above. - POCT Fecal Immunochemical Test (FIT); Future    5. Blood typing encounter  Patient requests. Ordered. - Type And Screen; Future        Return in about 6 months (around 6/7/2021) for Follow up.     Orders Placed   Orders Placed This Encounter   Procedures    XR SHOULDER RIGHT (MIN 2 VIEWS)     Standing Status:   Future     Standing Expiration Date:   12/7/2021     Order Specific Question:   Reason for exam:     Answer:   Right shoulder pain    Zoster recombinant [facility])    CBC With Auto Differential     Standing Status:   Future     Standing Expiration Date:   12/7/2021    Comprehensive Metabolic Panel     Standing Status:   Future     Standing Expiration Date:   12/7/2021    Lipid Panel     Standing Status:   Future     Standing Expiration Date:   12/7/2021     Order Specific Question:   Is Patient Fasting?/# of Hours     Answer:   yes    PSA Prostatic Specific Antigen     Standing Status:   Future     Standing Expiration Date:   12/7/2021    POCT Fecal Immunochemical Test (FIT)     Standing Status:   Future     Standing Expiration Date:   12/7/2021    Type And Screen     Standing Status:   Future     Standing Expiration Date:   12/7/2021       Prescriptions given/sent  Orders Placed This Encounter   Medications    FLUoxetine (PROZAC) 40 MG capsule     Sig: Take 1 capsule by mouth daily     Dispense:  90 capsule     Refill:  1    atorvastatin (LIPITOR) 40 MG tablet     Sig: Take 1 tablet by mouth daily     Dispense:  90 tablet     Refill:  1       Patient given educational materials - see patient instructions. Discussed use, benefit, and side effects of prescribed medications. All patientquestions answered. Pt voiced understanding. Reviewed health maintenance. Attending attestation:  I personally performed and participated key or critical portions of the evaluation and management including personally performing the exam and medical decision making. I verify the accuracy of the documentation by the resident with the following addition or changes: Well check today. Other issues as below.        Electronically signed by Lisa Zheng MD on 12/7/2020 at 8:37 PM        Electronically signed by Lisa Zheng MD on 12/7/2020 at 8:27 PM

## 2021-01-04 ENCOUNTER — NURSE ONLY (OUTPATIENT)
Dept: FAMILY MEDICINE CLINIC | Age: 61
End: 2021-01-04
Payer: COMMERCIAL

## 2021-01-04 DIAGNOSIS — E78.5 HYPERLIPIDEMIA, UNSPECIFIED HYPERLIPIDEMIA TYPE: ICD-10-CM

## 2021-01-04 DIAGNOSIS — Z01.83 BLOOD TYPING ENCOUNTER: ICD-10-CM

## 2021-01-04 DIAGNOSIS — N40.1 BENIGN PROSTATIC HYPERPLASIA WITH LOWER URINARY TRACT SYMPTOMS, SYMPTOM DETAILS UNSPECIFIED: ICD-10-CM

## 2021-01-04 DIAGNOSIS — Z12.11 SCREEN FOR COLON CANCER: ICD-10-CM

## 2021-01-04 LAB
CONTROL: NORMAL
HEMOCCULT STL QL: NEGATIVE

## 2021-01-04 PROCEDURE — 82274 ASSAY TEST FOR BLOOD FECAL: CPT | Performed by: FAMILY MEDICINE

## 2021-01-04 PROCEDURE — 36415 COLL VENOUS BLD VENIPUNCTURE: CPT | Performed by: NURSE PRACTITIONER

## 2021-01-05 LAB
ABO: NORMAL
ALBUMIN SERPL-MCNC: 4.3 G/DL (ref 3.5–5.1)
ALP BLD-CCNC: 73 U/L (ref 38–126)
ALT SERPL-CCNC: 32 U/L (ref 11–66)
ANION GAP SERPL CALCULATED.3IONS-SCNC: 11 MEQ/L (ref 8–16)
AST SERPL-CCNC: 26 U/L (ref 5–40)
BASOPHILS # BLD: 0.8 %
BASOPHILS ABSOLUTE: 0.1 THOU/MM3 (ref 0–0.1)
BILIRUB SERPL-MCNC: 0.5 MG/DL (ref 0.3–1.2)
BUN BLDV-MCNC: 16 MG/DL (ref 7–22)
CALCIUM SERPL-MCNC: 8.9 MG/DL (ref 8.5–10.5)
CHLORIDE BLD-SCNC: 104 MEQ/L (ref 98–111)
CHOLESTEROL, TOTAL: 207 MG/DL (ref 100–199)
CO2: 24 MEQ/L (ref 23–33)
CREAT SERPL-MCNC: 0.7 MG/DL (ref 0.4–1.2)
EOSINOPHIL # BLD: 2.7 %
EOSINOPHILS ABSOLUTE: 0.2 THOU/MM3 (ref 0–0.4)
ERYTHROCYTE [DISTWIDTH] IN BLOOD BY AUTOMATED COUNT: 13 % (ref 11.5–14.5)
ERYTHROCYTE [DISTWIDTH] IN BLOOD BY AUTOMATED COUNT: 46 FL (ref 35–45)
GFR SERPL CREATININE-BSD FRML MDRD: > 90 ML/MIN/1.73M2
GLUCOSE BLD-MCNC: 90 MG/DL (ref 70–108)
HCT VFR BLD CALC: 41.6 % (ref 42–52)
HDLC SERPL-MCNC: 56 MG/DL
HEMOGLOBIN: 13.7 GM/DL (ref 14–18)
IMMATURE GRANS (ABS): 0.03 THOU/MM3 (ref 0–0.07)
IMMATURE GRANULOCYTES: 0.5 %
LDL CHOLESTEROL CALCULATED: 137 MG/DL
LYMPHOCYTES # BLD: 22.8 %
LYMPHOCYTES ABSOLUTE: 1.5 THOU/MM3 (ref 1–4.8)
MCH RBC QN AUTO: 31.9 PG (ref 26–33)
MCHC RBC AUTO-ENTMCNC: 32.9 GM/DL (ref 32.2–35.5)
MCV RBC AUTO: 97 FL (ref 80–94)
MONOCYTES # BLD: 7.8 %
MONOCYTES ABSOLUTE: 0.5 THOU/MM3 (ref 0.4–1.3)
NUCLEATED RED BLOOD CELLS: 0 /100 WBC
PLATELET # BLD: 179 THOU/MM3 (ref 130–400)
PMV BLD AUTO: 11.5 FL (ref 9.4–12.4)
POTASSIUM SERPL-SCNC: 4.4 MEQ/L (ref 3.5–5.2)
PROSTATE SPECIFIC ANTIGEN: 1.25 NG/ML (ref 0–1)
RBC # BLD: 4.29 MILL/MM3 (ref 4.7–6.1)
RH FACTOR: NORMAL
SEG NEUTROPHILS: 65.4 %
SEGMENTED NEUTROPHILS ABSOLUTE COUNT: 4.2 THOU/MM3 (ref 1.8–7.7)
SODIUM BLD-SCNC: 139 MEQ/L (ref 135–145)
TOTAL PROTEIN: 6.9 G/DL (ref 6.1–8)
TRIGL SERPL-MCNC: 72 MG/DL (ref 0–199)
WBC # BLD: 6.4 THOU/MM3 (ref 4.8–10.8)

## 2021-06-03 DIAGNOSIS — N40.1 BENIGN PROSTATIC HYPERPLASIA WITH LOWER URINARY TRACT SYMPTOMS, SYMPTOM DETAILS UNSPECIFIED: ICD-10-CM

## 2021-06-03 RX ORDER — TAMSULOSIN HYDROCHLORIDE 0.4 MG/1
CAPSULE ORAL
Qty: 90 CAPSULE | Refills: 1 | Status: SHIPPED | OUTPATIENT
Start: 2021-06-03 | End: 2021-12-02

## 2021-08-12 ENCOUNTER — TELEPHONE (OUTPATIENT)
Dept: FAMILY MEDICINE CLINIC | Age: 61
End: 2021-08-12

## 2021-08-12 NOTE — TELEPHONE ENCOUNTER
----- Message from Willean Krabbe sent at 8/12/2021  3:34 PM EDT -----  Subject: Appointment Request    Reason for Call: Urgent Cough Cold    QUESTIONS  Type of Appointment? Established Patient  Reason for appointment request? No appointments available during search  Additional Information for Provider? Patient has had a cold two weeks ago   with fever, he got better but has a cough that is still lingering and   would like to be seen. No appts showing at this time. Please call and   schedule  ---------------------------------------------------------------------------  --------------  CALL BACK INFO  What is the best way for the office to contact you? OK to leave message on   voicemail  Preferred Call Back Phone Number? [phone]  ---------------------------------------------------------------------------  --------------  SCRIPT ANSWERS  Relationship to Patient? Self  Are you currently unable to finish sentences due to any difficulty   breathing? No  Are you unable to swallow liquids? No  Are you having fevers (100.4 or greater), chills, or sweats? No  Do you have COPD, asthma or a chronic lung condition? No  Have your symptoms been present for more than 5 days? Yes   Have you been diagnosed with, awaiting test results for, or told that you   are suspected of having COVID-19 (Coronavirus)? (If patient has tested   negative or was tested as a requirement for work, school, or travel and   not based on symptoms, answer no)? No  Do you currently have flu-like symptoms including fever or chills, cough,   shortness of breath, difficulty breathing, or new loss of taste or smell? No  Have you had close contact with someone with COVID-19 in the last 14 days? No  (Service Expert  click yes below to proceed with [employer] As Usual   Scheduling)?  Yes

## 2021-08-12 NOTE — TELEPHONE ENCOUNTER
Left message with patient offering Urgent Care locations and hours of operation for walk ins. Also offered walk in appointment with our office tomorrow, 8/13/21 at 8am.  Thank you.

## 2021-08-13 ENCOUNTER — OFFICE VISIT (OUTPATIENT)
Dept: FAMILY MEDICINE CLINIC | Age: 61
End: 2021-08-13
Payer: COMMERCIAL

## 2021-08-13 VITALS
HEIGHT: 66 IN | DIASTOLIC BLOOD PRESSURE: 78 MMHG | WEIGHT: 157.4 LBS | HEART RATE: 67 BPM | OXYGEN SATURATION: 98 % | BODY MASS INDEX: 25.3 KG/M2 | RESPIRATION RATE: 16 BRPM | SYSTOLIC BLOOD PRESSURE: 108 MMHG | TEMPERATURE: 97.9 F

## 2021-08-13 DIAGNOSIS — F32.A DEPRESSION, UNSPECIFIED DEPRESSION TYPE: ICD-10-CM

## 2021-08-13 DIAGNOSIS — E78.5 HYPERLIPIDEMIA, UNSPECIFIED HYPERLIPIDEMIA TYPE: ICD-10-CM

## 2021-08-13 DIAGNOSIS — H91.8X9 ASYMMETRICAL HEARING LOSS: ICD-10-CM

## 2021-08-13 DIAGNOSIS — R05.9 COUGH: Primary | ICD-10-CM

## 2021-08-13 PROCEDURE — 99214 OFFICE O/P EST MOD 30 MIN: CPT | Performed by: FAMILY MEDICINE

## 2021-08-13 RX ORDER — AZITHROMYCIN 250 MG/1
250 TABLET, FILM COATED ORAL SEE ADMIN INSTRUCTIONS
Qty: 6 TABLET | Refills: 0 | Status: SHIPPED | OUTPATIENT
Start: 2021-08-13 | End: 2021-08-18

## 2021-08-13 RX ORDER — FLUOXETINE HYDROCHLORIDE 40 MG/1
40 CAPSULE ORAL DAILY
Qty: 90 CAPSULE | Refills: 1 | Status: SHIPPED | OUTPATIENT
Start: 2021-08-13 | End: 2022-01-03 | Stop reason: SDUPTHER

## 2021-08-13 RX ORDER — ATORVASTATIN CALCIUM 40 MG/1
40 TABLET, FILM COATED ORAL DAILY
Qty: 90 TABLET | Refills: 1 | Status: SHIPPED | OUTPATIENT
Start: 2021-08-13 | End: 2022-01-03 | Stop reason: SDUPTHER

## 2021-08-13 ASSESSMENT — ENCOUNTER SYMPTOMS
WHEEZING: 0
SORE THROAT: 0
COUGH: 1
SHORTNESS OF BREATH: 0
HEMOPTYSIS: 0
RHINORRHEA: 1
HEARTBURN: 0

## 2021-08-13 NOTE — PROGRESS NOTES
[address] [place] [address]  Dept: [phone]  Dept Fax: [phone]  Loc: [phone]      Renetta Melo is a 63 y.o. male who presents todayfor Cough (x2.5 weeks)      :   Cough  This is a new problem. The current episode started 1 to 4 weeks ago. The problem has been unchanged. The cough is productive of sputum (Intermittent improved). Associated symptoms include nasal congestion (improving), postnasal drip and rhinorrhea. Pertinent negatives include no chest pain, chills, ear congestion (only popping with plane landing; a bit hard of hearing), ear pain, fever, headaches, heartburn, hemoptysis, myalgias, rash, sore throat (initial gone now), shortness of breath (coughs with deep breath), sweats, weight loss or wheezing. Treatments tried: Took advil; not recently; also took cough syrup a couple days ago. His past medical history is significant for asthma (childhood) and environmental allergies (cats and dust and feathers). There is no history of bronchiectasis, bronchitis, COPD, emphysema or pneumonia. Just returned from a trip to [facility].      No fevers ever. Did get chills that are gone now. patient has No Known Allergies. Past MedicalHistory  Chuck Bravo  has a past medical history of Depression, Hyperlipidemia, and Irritable bowel syndrome (IBS). Past Surgical History  The patient  has a past surgical history that includes Appendectomy and Tonsillectomy. Family History  This patient's family history includes Alzheimer's Disease in his mother; Uterine Cancer in his sister. Social History  Chuck Bravo  reports that he has never smoked. He has never used smokeless tobacco. He reports that he does not drink alcohol and does not use drugs.     Medications    Current Outpatient Medications:     FLUoxetine (PROZAC) 40 MG capsule, Take 1 capsule by mouth daily, Disp: 90 capsule, Rfl: 1    atorvastatin (LIPITOR) 40 MG tablet, Take 1 tablet by mouth daily, Disp: 90 tablet, Rfl: 1    azithromycin (ZITHROMAX) 250 MG tablet, Take 1 tablet by mouth See Admin Instructions for 5 days 500mg on day 1 followed by 250mg on days 2 - 5; start if worsening or not improving, Disp: 6 tablet, Rfl: 0    tamsulosin (FLOMAX) 0.4 MG capsule, TAKE 1 CAPSULE BY MOUTH EVERY DAY, Disp: 90 capsule, Rfl: 1    Cholecalciferol (VITAMIN D) 2000 units CAPS capsule, Take by mouth daily, Disp: , Rfl:     DAILY MULTIPLE VITAMINS TABS, Take by mouth, Disp: , Rfl:     Digestive Enzyme CAPS, Take by mouth daily , Disp: , Rfl:     Lactobacillus (ACIDOPHILUS) 100 MG CAPS, Take by mouth, Disp: , Rfl:     :     Review of Systems   Constitutional: Negative for chills, fever and weight loss. HENT: Positive for postnasal drip and rhinorrhea. Negative for ear pain and sore throat (initial gone now). Respiratory: Positive for cough. Negative for hemoptysis, shortness of breath (coughs with deep breath) and wheezing. Cardiovascular: Negative for chest pain. Gastrointestinal: Negative for heartburn. Musculoskeletal: Negative for myalgias. Skin: Negative for rash. Allergic/Immunologic: Positive for environmental allergies (cats and dust and feathers). Neurological: Negative for headaches.       :     Vitals:    08/13/21 0836   BP: 108/78   Site: Left Upper Arm   Pulse: 67   Resp: 16   Temp: 97.9 °F (36.6 °C)   TempSrc: Oral   SpO2: 98%   Weight: 157 lb 6.4 oz (71.4 kg)   Height: 5' 6\" (1.676 m)       Physical Exam  Vitals reviewed. Constitutional:       Appearance: He is well-developed. HENT:      Head: Normocephalic and atraumatic. Right Ear: Ear canal and external ear normal. Decreased hearing noted. No laceration, drainage, swelling or tenderness. A middle ear effusion is present. There is no impacted cerumen. No foreign body. No mastoid tenderness. No PE tube. No hemotympanum.  Tympanic membrane is not injected, scarred, perforated, Future  - azithromycin (ZITHROMAX) 250 MG tablet; Take 1 tablet by mouth See Admin Instructions for 5 days 500mg on day 1 followed by 250mg on days 2 - 5; start if worsening or not improving  Dispense: 6 tablet; Refill: 0    2. Depression, unspecified depression type  Stable. Refilled. - FLUoxetine (PROZAC) 40 MG capsule; Take 1 capsule by mouth daily  Dispense: 90 capsule; Refill: 1    3. Hyperlipidemia, unspecified hyperlipidemia type  Refilled. - atorvastatin (LIPITOR) 40 MG tablet; Take 1 tablet by mouth daily  Dispense: 90 tablet; Refill: 1    4. Asymmetrical hearing loss  Bilateral ear effusions may contribute to worsening today. Will need audiology follow-up if not improved. Return in about 4 months (around 12/13/2021), or if symptoms worsen or fail to improve, for Annual wellness visit. Orders Placed  Orders Placed This Encounter   Procedures    COVID-19     Negative prior. Standing Status:   Future     Standing Expiration Date:   8/13/2022     Scheduling Instructions:      1) Due to current limited availability of the COVID-19 test, tests will be prioritized based on responses to questions above. Testing may be delayed due to volume. 2) Print and instruct patient to adhere to CDC home isolation program. (Link Above)              3) Set up or refer patient for a monitoring program.              4) Have patient sign up for and leverage [employer] (if not previously done). Order Specific Question:   Is this test for diagnosis or screening? Answer:   Diagnosis of ill patient     Order Specific Question:   Symptomatic for COVID-19 as defined by CDC? Answer:   Yes     Order Specific Question:   Date of Symptom Onset     Answer:   8/13/2021     Order Specific Question:   Hospitalized for COVID-19? Answer:   No     Order Specific Question:   Admitted to ICU for COVID-19? Answer:   No     Order Specific Question:   Employed in healthcare setting? Answer:    No

## 2021-08-13 NOTE — PATIENT INSTRUCTIONS
Patient Education     Start azithromycin if worsening or not improving. Bronchitis: Care Instructions  Your Care Instructions     Bronchitis is inflammation of the bronchial tubes, which carry air to the lungs. The tubes swell and produce mucus, or phlegm. The mucus and inflamed bronchial tubes make you cough. You may have trouble breathing. Most cases of bronchitis are caused by viruses like those that cause colds. Antibiotics usually do not help and they may be harmful. Bronchitis usually develops rapidly and lasts about 2 to 3 weeks in otherwise healthy people. Follow-up care is a key part of your treatment and safety. Be sure to make and go to all appointments, and call your doctor if you are having problems. It's also a good idea to know your test results and keep a list of the medicines you take. How can you care for yourself at home? · Take all medicines exactly as prescribed. Call your doctor if you think you are having a problem with your medicine. · Get some extra rest.  · Take an over-the-counter pain medicine, such as acetaminophen (Tylenol), ibuprofen (Advil, Motrin), or naproxen (Aleve) to reduce fever and relieve body aches. Read and follow all instructions on the label. · Do not take two or more pain medicines at the same time unless the doctor told you to. Many pain medicines have acetaminophen, which is Tylenol. Too much acetaminophen (Tylenol) can be harmful. · Take an over-the-counter cough medicine that contains dextromethorphan to help quiet a dry, hacking cough so that you can sleep. Avoid cough medicines that have more than one active ingredient. Read and follow all instructions on the label. · Breathe moist air from a humidifier, hot shower, or sink filled with hot water. The heat and moisture will thin mucus so you can cough it out. · Do not smoke. Smoking can make bronchitis worse. If you need help quitting, talk to your doctor about stop-smoking programs and medicines.  These can increase your chances of quitting for good. When should you call for help? Call 911 anytime you think you may need emergency care. For example, call if:    · You have severe trouble breathing. Call your doctor now or seek immediate medical care if:    · You have new or worse trouble breathing.     · You cough up dark brown or bloody mucus (sputum).     · You have a new or higher fever.     · You have a new rash. Watch closely for changes in your health, and be sure to contact your doctor if:    · You cough more deeply or more often, especially if you notice more mucus or a change in the color of your mucus.     · You are not getting better as expected. Where can you learn more? Go to [email or web address] and sign in to your [employer] account. Enter H333 in the [employer] box to learn more about \"Bronchitis: Care Instructions. \"     If you do not have an account, please click on the \"Sign Up Now\" link. Current as of: October 26, 2020               Content Version: 12.9  © 2006-2021 Healthwise, Incorporated. Care instructions adapted under license by [facility] ([facility]). If you have questions about a medical condition or this instruction, always ask your healthcare professional. [place] [address] any warranty or liability for your use of this information.

## 2021-08-16 LAB
SARS-COV-2: NOT DETECTED
SOURCE: NORMAL

## 2021-12-02 DIAGNOSIS — N40.1 BENIGN PROSTATIC HYPERPLASIA WITH LOWER URINARY TRACT SYMPTOMS, SYMPTOM DETAILS UNSPECIFIED: ICD-10-CM

## 2021-12-02 RX ORDER — TAMSULOSIN HYDROCHLORIDE 0.4 MG/1
CAPSULE ORAL
Qty: 90 CAPSULE | Refills: 1 | Status: SHIPPED | OUTPATIENT
Start: 2021-12-02 | End: 2022-01-03 | Stop reason: SDUPTHER

## 2022-01-03 ENCOUNTER — OFFICE VISIT (OUTPATIENT)
Dept: FAMILY MEDICINE CLINIC | Age: 62
End: 2022-01-03
Payer: COMMERCIAL

## 2022-01-03 VITALS
WEIGHT: 163 LBS | OXYGEN SATURATION: 97 % | TEMPERATURE: 97.1 F | HEIGHT: 66 IN | BODY MASS INDEX: 26.2 KG/M2 | SYSTOLIC BLOOD PRESSURE: 110 MMHG | DIASTOLIC BLOOD PRESSURE: 60 MMHG | RESPIRATION RATE: 16 BRPM | HEART RATE: 79 BPM

## 2022-01-03 DIAGNOSIS — R41.3 MEMORY CHANGE: ICD-10-CM

## 2022-01-03 DIAGNOSIS — Z00.00 VISIT FOR WELL MAN HEALTH CHECK: ICD-10-CM

## 2022-01-03 DIAGNOSIS — N40.0 BENIGN PROSTATIC HYPERPLASIA, UNSPECIFIED WHETHER LOWER URINARY TRACT SYMPTOMS PRESENT: Primary | ICD-10-CM

## 2022-01-03 DIAGNOSIS — H91.93 BILATERAL CHANGE IN HEARING: ICD-10-CM

## 2022-01-03 DIAGNOSIS — F32.A DEPRESSION, UNSPECIFIED DEPRESSION TYPE: ICD-10-CM

## 2022-01-03 DIAGNOSIS — H53.9 VISION CHANGES: ICD-10-CM

## 2022-01-03 DIAGNOSIS — Z12.11 SCREEN FOR COLON CANCER: ICD-10-CM

## 2022-01-03 DIAGNOSIS — E78.5 HYPERLIPIDEMIA, UNSPECIFIED HYPERLIPIDEMIA TYPE: ICD-10-CM

## 2022-01-03 LAB
BILIRUBIN, POC: NEGATIVE
BLOOD URINE, POC: NEGATIVE
CLARITY, POC: CLEAR
COLOR, POC: YELLOW
GLUCOSE URINE, POC: NEGATIVE
KETONES, POC: NEGATIVE
LEUKOCYTE EST, POC: NEGATIVE
NITRITE, POC: NEGATIVE
PH, POC: 5.5
PROTEIN, POC: NEGATIVE
SPECIFIC GRAVITY, POC: 1.01
UROBILINOGEN, POC: 0.2

## 2022-01-03 PROCEDURE — G0444 DEPRESSION SCREEN ANNUAL: HCPCS | Performed by: FAMILY MEDICINE

## 2022-01-03 PROCEDURE — 81003 URINALYSIS AUTO W/O SCOPE: CPT | Performed by: FAMILY MEDICINE

## 2022-01-03 PROCEDURE — 99396 PREV VISIT EST AGE 40-64: CPT | Performed by: FAMILY MEDICINE

## 2022-01-03 RX ORDER — ATORVASTATIN CALCIUM 40 MG/1
40 TABLET, FILM COATED ORAL DAILY
Qty: 90 TABLET | Refills: 1 | Status: SHIPPED | OUTPATIENT
Start: 2022-01-03 | End: 2022-07-05

## 2022-01-03 RX ORDER — FLUOXETINE HYDROCHLORIDE 40 MG/1
40 CAPSULE ORAL DAILY
Qty: 90 CAPSULE | Refills: 1 | Status: SHIPPED | OUTPATIENT
Start: 2022-01-03 | End: 2022-07-05

## 2022-01-03 RX ORDER — TAMSULOSIN HYDROCHLORIDE 0.4 MG/1
0.4 CAPSULE ORAL DAILY
Qty: 90 CAPSULE | Refills: 1 | Status: SHIPPED | OUTPATIENT
Start: 2022-01-03 | End: 2022-07-05 | Stop reason: SDUPTHER

## 2022-01-03 ASSESSMENT — PATIENT HEALTH QUESTIONNAIRE - PHQ9
SUM OF ALL RESPONSES TO PHQ QUESTIONS 1-9: 2
1. LITTLE INTEREST OR PLEASURE IN DOING THINGS: 1
SUM OF ALL RESPONSES TO PHQ9 QUESTIONS 1 & 2: 2
SUM OF ALL RESPONSES TO PHQ QUESTIONS 1-9: 2
2. FEELING DOWN, DEPRESSED OR HOPELESS: 1

## 2022-01-03 ASSESSMENT — ENCOUNTER SYMPTOMS
EYE DISCHARGE: 0
BACK PAIN: 0
SHORTNESS OF BREATH: 0
DIARRHEA: 0
VOMITING: 0
SORE THROAT: 0
CONSTIPATION: 0
ABDOMINAL PAIN: 0
COUGH: 0
EYE REDNESS: 0
NAUSEA: 0

## 2022-01-03 NOTE — PROGRESS NOTES
[address] [address]  Dept: [phone]  Dept Fax: [phone]  Loc: [phone]      Andrew Lanier is a 57 y.o. male who presents todayfor his medical conditions/complaints as noted below. Andrew Lanier is c/o of Annual Exam      :     \[facility]\""     Here for annual well check. Good Bob celebration. Did host a lot of people. Tested all for COVID-19 prior. No further blood in stools. No issues with tiny hernia. Hearing is still difficult. Still getting memory issues. Mother had alzheimer. Reading is more difficult. Will go back to eye specialist.  No trouble with distance. Has bifocals. Close up is not using glasses. 2 grandchildren. One was diagnosed with type 1 diabetes. Just diagnosed at age 3. Occasionally gets a tightness in chest. Lasts seconds. Never with exercises. Gets regular exercise. Patient Active Problem List   Diagnosis    Irritable bowel syndrome (IBS)    Hyperlipidemia    Depression    Actinic keratosis    Plantar wart of right foot    Asymmetrical hearing loss    Benign prostatic hyperplasia with lower urinary tract symptoms    Low serum vitamin D    Memory changes     Goals    None       The patient has No Known Allergies. Medical History  Flo Cohen has a past medical history of Depression, Hyperlipidemia, and Irritable bowel syndrome (IBS). Past SurgicalHistory  The patient  has a past surgical history that includes Appendectomy and Tonsillectomy. Family History  This patient's family history includes Alzheimer's Disease in his mother; Uterine Cancer in his sister. Social History  Flo Cohen  reports that he has never smoked. He has never used smokeless tobacco. He reports that he does not drink alcohol and does not use drugs.     Medications    Current Outpatient Medications:     FLUoxetine (PROZAC) 40 MG capsule, Take 1 capsule by mouth daily, Disp: 90 capsule, Rfl: 1    atorvastatin (LIPITOR) 40 MG tablet, Take 1 tablet by mouth daily, Disp: 90 tablet, Rfl: 1    tamsulosin (FLOMAX) 0.4 MG capsule, Take 1 capsule by mouth daily, Disp: 90 capsule, Rfl: 1    Cholecalciferol (VITAMIN D) 2000 units CAPS capsule, Take by mouth daily, Disp: , Rfl:     DAILY MULTIPLE VITAMINS TABS, Take by mouth, Disp: , Rfl:     Digestive Enzyme CAPS, Take by mouth daily , Disp: , Rfl:     Lactobacillus (ACIDOPHILUS) 100 MG CAPS, Take by mouth, Disp: , Rfl:     Subjective:      Review of Systems   Constitutional: Negative for chills, fatigue, fever and unexpected weight change. HENT: Positive for hearing loss. Negative for congestion, ear discharge, ear pain and sore throat. Eyes: Positive for visual disturbance. Negative for discharge and redness. Respiratory: Negative for cough and shortness of breath. Cardiovascular: Positive for chest pain (fleeting lasting seconds; never with exertion). Negative for palpitations. Gastrointestinal: Negative for abdominal pain, constipation, diarrhea, nausea and vomiting. Genitourinary: Negative for difficulty urinating and dysuria. Musculoskeletal: Negative for arthralgias, back pain, gait problem and neck pain. Skin: Negative for rash. Allergic/Immunologic: Negative for environmental allergies. Neurological: Negative for headaches. Psychiatric/Behavioral: Negative for dysphoric mood and sleep disturbance. The patient is not nervous/anxious. Objective:     Vitals:    01/03/22 1502   BP: 110/60   Site: Left Upper Arm   Position: Sitting   Pulse: 79   Resp: 16   Temp: 97.1 °F (36.2 °C)   TempSrc: Temporal   SpO2: 97%   Weight: 163 lb (73.9 kg)   Height: 5' 6\" (1.676 m)       Physical Exam  Vitals reviewed. Constitutional:       General: He is not in acute distress. Appearance: He is well-developed. HENT:      Head: Normocephalic and atraumatic.       Right Ear: Tympanic membrane, ear canal and external ear normal.      Left Ear: Tympanic membrane, ear canal and external ear normal.   Eyes:      Conjunctiva/sclera: Conjunctivae normal.   Cardiovascular:      Rate and Rhythm: Normal rate and regular rhythm. Heart sounds: Normal heart sounds. Pulmonary:      Effort: Pulmonary effort is normal. No respiratory distress. Breath sounds: Normal breath sounds. Abdominal:      Palpations: Abdomen is soft. Tenderness: There is no abdominal tenderness. Musculoskeletal:      Cervical back: Neck supple. Skin:     General: Skin is warm and dry. Comments: No obvious rash. Neurological:      Mental Status: He is alert. Comments: No obvious focal deficit. Psychiatric:         Behavior: Behavior normal.         Lab Results   Component Value Date    WBC 6.4 01/04/2021    HGB 13.7 (L) 01/04/2021    HCT 41.6 (L) 01/04/2021     01/04/2021    CHOL 207 (H) 01/04/2021    TRIG 72 01/04/2021    HDL 56 01/04/2021    ALT 32 01/04/2021    AST 26 01/04/2021     01/04/2021    K 4.4 01/04/2021     01/04/2021    CREATININE 0.7 01/04/2021    BUN 16 01/04/2021    CO2 24 01/04/2021    TSH 2.530 01/14/2019    PSA 1.25 (H) 01/04/2021       Ronnell Jacques:   1. Visit for well man health check  Well check today. Chronic issues stable. No changes. 2. Depression, unspecified depression type  Stable. - FLUoxetine (PROZAC) 40 MG capsule; Take 1 capsule by mouth daily  Dispense: 90 capsule; Refill: 1  - IA DEPRESSION SCREEN ANNUAL  - Lipid Panel; Future  - CBC Auto Differential; Future  - Comprehensive Metabolic Panel; Future  - TSH With Reflex Ft4; Future    3. Hyperlipidemia, unspecified hyperlipidemia type  Checking labs. - atorvastatin (LIPITOR) 40 MG tablet; Take 1 tablet by mouth daily  Dispense: 90 tablet; Refill: 1  - Lipid Panel; Future  - CBC Auto Differential; Future  - Comprehensive Metabolic Panel; Future  - TSH With Reflex Ft4; Future    4.  Benign prostatic hyperplasia, unspecified whether lower urinary tract symptoms present  Stable. - PSA Prostatic Specific Antigen; Future  - tamsulosin (FLOMAX) 0.4 MG capsule; Take 1 capsule by mouth daily  Dispense: 90 capsule; Refill: 1  - POCT Urinalysis No Micro (Auto)    5. Screen for colon cancer  Will re-screen with FIT test.    - POCT Fecal Immunochemical Test (FIT); Future    6. Bilateral change in hearing  Will follow-up with audiology. 7. Vision changes  Will follow-up with ophthalmology re: difficulties in near vision. 8. Memory change  Stable MMSE 26/30. Will follow. Return in about 6 months (around 7/3/2022) for Follow-up chronic conditions.     Orders Placed   Orders Placed This Encounter   Procedures    Lipid Panel     Standing Status:   Future     Standing Expiration Date:   1/3/2023     Order Specific Question:   Is Patient Fasting?/# of Hours     Answer:   0    CBC Auto Differential     Standing Status:   Future     Standing Expiration Date:   1/3/2023    Comprehensive Metabolic Panel     Standing Status:   Future     Standing Expiration Date:   1/3/2023    TSH With Reflex Ft4     Standing Status:   Future     Standing Expiration Date:   1/3/2023    PSA Prostatic Specific Antigen     Standing Status:   Future     Standing Expiration Date:   1/3/2023    POCT Fecal Immunochemical Test (FIT)     Standing Status:   Future     Standing Expiration Date:   1/3/2023    POCT Urinalysis No Micro (Auto)    IN DEPRESSION SCREEN ANNUAL       Prescriptions given/sent  Orders Placed This Encounter   Medications    FLUoxetine (PROZAC) 40 MG capsule     Sig: Take 1 capsule by mouth daily     Dispense:  90 capsule     Refill:  1    atorvastatin (LIPITOR) 40 MG tablet     Sig: Take 1 tablet by mouth daily     Dispense:  90 tablet     Refill:  1    tamsulosin (FLOMAX) 0.4 MG capsule     Sig: Take 1 capsule by mouth daily     Dispense:  90 capsule     Refill:  1       Patient given educational materials - see patient instructions. Discussed use, benefit, and side effects of recommended medications. All patient questions answered. Pt voiced understanding. Reviewed health maintenance.               Electronically signed by Rosemary Boss MD on 1/3/2022 at 4:04 PM

## 2022-02-15 ENCOUNTER — NURSE ONLY (OUTPATIENT)
Dept: FAMILY MEDICINE CLINIC | Age: 62
End: 2022-02-15
Payer: COMMERCIAL

## 2022-02-15 DIAGNOSIS — N40.0 BENIGN PROSTATIC HYPERPLASIA, UNSPECIFIED WHETHER LOWER URINARY TRACT SYMPTOMS PRESENT: ICD-10-CM

## 2022-02-15 DIAGNOSIS — F32.A DEPRESSION, UNSPECIFIED DEPRESSION TYPE: ICD-10-CM

## 2022-02-15 DIAGNOSIS — E78.5 HYPERLIPIDEMIA, UNSPECIFIED HYPERLIPIDEMIA TYPE: ICD-10-CM

## 2022-02-15 LAB
BASOPHILS # BLD: 0.8 %
BASOPHILS ABSOLUTE: 0 THOU/MM3 (ref 0–0.1)
EOSINOPHIL # BLD: 3.2 %
EOSINOPHILS ABSOLUTE: 0.2 THOU/MM3 (ref 0–0.4)
ERYTHROCYTE [DISTWIDTH] IN BLOOD BY AUTOMATED COUNT: 13.1 % (ref 11.5–14.5)
ERYTHROCYTE [DISTWIDTH] IN BLOOD BY AUTOMATED COUNT: 46.5 FL (ref 35–45)
HCT VFR BLD CALC: 43.5 % (ref 42–52)
HEMOGLOBIN: 14 GM/DL (ref 14–18)
IMMATURE GRANS (ABS): 0.01 THOU/MM3 (ref 0–0.07)
IMMATURE GRANULOCYTES: 0.2 %
LYMPHOCYTES # BLD: 22.5 %
LYMPHOCYTES ABSOLUTE: 1.4 THOU/MM3 (ref 1–4.8)
MCH RBC QN AUTO: 31.2 PG (ref 26–33)
MCHC RBC AUTO-ENTMCNC: 32.2 GM/DL (ref 32.2–35.5)
MCV RBC AUTO: 96.9 FL (ref 80–94)
MONOCYTES # BLD: 6.8 %
MONOCYTES ABSOLUTE: 0.4 THOU/MM3 (ref 0.4–1.3)
NUCLEATED RED BLOOD CELLS: 0 /100 WBC
PLATELET # BLD: 177 THOU/MM3 (ref 130–400)
PMV BLD AUTO: 11.2 FL (ref 9.4–12.4)
RBC # BLD: 4.49 MILL/MM3 (ref 4.7–6.1)
SEG NEUTROPHILS: 66.5 %
SEGMENTED NEUTROPHILS ABSOLUTE COUNT: 4.1 THOU/MM3 (ref 1.8–7.7)
WBC # BLD: 6.2 THOU/MM3 (ref 4.8–10.8)

## 2022-02-15 PROCEDURE — 36415 COLL VENOUS BLD VENIPUNCTURE: CPT | Performed by: NURSE PRACTITIONER

## 2022-02-16 LAB
ALBUMIN SERPL-MCNC: 4.3 G/DL (ref 3.5–5.1)
ALP BLD-CCNC: 62 U/L (ref 38–126)
ALT SERPL-CCNC: 29 U/L (ref 11–66)
ANION GAP SERPL CALCULATED.3IONS-SCNC: 11 MEQ/L (ref 8–16)
AST SERPL-CCNC: 28 U/L (ref 5–40)
BILIRUB SERPL-MCNC: 0.6 MG/DL (ref 0.3–1.2)
BUN BLDV-MCNC: 16 MG/DL (ref 7–22)
CALCIUM SERPL-MCNC: 9 MG/DL (ref 8.5–10.5)
CHLORIDE BLD-SCNC: 103 MEQ/L (ref 98–111)
CHOLESTEROL, TOTAL: 177 MG/DL (ref 100–199)
CO2: 25 MEQ/L (ref 23–33)
CREAT SERPL-MCNC: 0.9 MG/DL (ref 0.4–1.2)
GFR SERPL CREATININE-BSD FRML MDRD: 85 ML/MIN/1.73M2
GLUCOSE BLD-MCNC: 89 MG/DL (ref 70–108)
HDLC SERPL-MCNC: 60 MG/DL
LDL CHOLESTEROL CALCULATED: 106 MG/DL
POTASSIUM SERPL-SCNC: 4.4 MEQ/L (ref 3.5–5.2)
PROSTATE SPECIFIC ANTIGEN: 0.98 NG/ML (ref 0–1)
SODIUM BLD-SCNC: 139 MEQ/L (ref 135–145)
TOTAL PROTEIN: 7.1 G/DL (ref 6.1–8)
TRIGL SERPL-MCNC: 53 MG/DL (ref 0–199)
TSH SERPL DL<=0.05 MIU/L-ACNC: 2.41 UIU/ML (ref 0.4–4.2)

## 2022-07-04 DIAGNOSIS — N40.0 BENIGN PROSTATIC HYPERPLASIA, UNSPECIFIED WHETHER LOWER URINARY TRACT SYMPTOMS PRESENT: ICD-10-CM

## 2022-07-04 DIAGNOSIS — E78.5 HYPERLIPIDEMIA, UNSPECIFIED HYPERLIPIDEMIA TYPE: ICD-10-CM

## 2022-07-04 DIAGNOSIS — F32.A DEPRESSION, UNSPECIFIED DEPRESSION TYPE: ICD-10-CM

## 2022-07-05 RX ORDER — TAMSULOSIN HYDROCHLORIDE 0.4 MG/1
0.4 CAPSULE ORAL DAILY
Qty: 90 CAPSULE | Refills: 1 | Status: SHIPPED | OUTPATIENT
Start: 2022-07-05

## 2022-07-05 RX ORDER — FLUOXETINE HYDROCHLORIDE 40 MG/1
CAPSULE ORAL
Qty: 90 CAPSULE | Refills: 1 | Status: SHIPPED | OUTPATIENT
Start: 2022-07-05

## 2022-07-05 RX ORDER — ATORVASTATIN CALCIUM 40 MG/1
TABLET, FILM COATED ORAL
Qty: 90 TABLET | Refills: 1 | Status: SHIPPED | OUTPATIENT
Start: 2022-07-05

## 2022-07-05 NOTE — TELEPHONE ENCOUNTER
Patient's last appointment was : 1/3/2022  Patient's next appointment is : 7/18/2022  Last refilled: 1/3/22

## 2022-07-08 ENCOUNTER — TELEPHONE (OUTPATIENT)
Dept: FAMILY MEDICINE CLINIC | Age: 62
End: 2022-07-08

## 2022-07-08 DIAGNOSIS — U07.1 COVID-19: Primary | ICD-10-CM

## 2022-07-08 NOTE — TELEPHONE ENCOUNTER
Patient called to report he tested positive for COVID-19. He has mild symptsom with cough and congestion. No dyspnea or severe symptoms. He checked a pulse ox and was running 94-95%. High risk given age, hyperlipidemia, and depression. Will treat with Paxlovid. Fine to hold statin while on this and resume the day after last dose. Encouraged supportive care also with rest, hydration, honey for cough. Tylenol and ibuprofen as needed. Return promptly if worsening or in 1-2 weeks if symptom persist. Indications for prompt return and emergent presentation if worsening reviewed in detail. Encouraged checking home pulse oximetry with call if persistently 94 or less or any reading under 90. Reviewed isolation guidelines. Fortunately Grady Galindo is recovering well although still testing positive.

## 2022-07-14 DIAGNOSIS — Z12.11 SCREEN FOR COLON CANCER: ICD-10-CM

## 2022-07-14 LAB
CONTROL: NORMAL
HEMOCCULT STL QL: NEGATIVE

## 2022-07-14 PROCEDURE — 82274 ASSAY TEST FOR BLOOD FECAL: CPT | Performed by: FAMILY MEDICINE

## 2022-07-18 ENCOUNTER — OFFICE VISIT (OUTPATIENT)
Dept: FAMILY MEDICINE CLINIC | Age: 62
End: 2022-07-18
Payer: COMMERCIAL

## 2022-07-18 VITALS
WEIGHT: 164 LBS | HEIGHT: 66 IN | DIASTOLIC BLOOD PRESSURE: 70 MMHG | SYSTOLIC BLOOD PRESSURE: 120 MMHG | BODY MASS INDEX: 26.36 KG/M2 | OXYGEN SATURATION: 98 % | RESPIRATION RATE: 16 BRPM | HEART RATE: 80 BPM

## 2022-07-18 DIAGNOSIS — H93.19 TINNITUS, UNSPECIFIED LATERALITY: ICD-10-CM

## 2022-07-18 DIAGNOSIS — Z23 NEED FOR SHINGLES VACCINE: ICD-10-CM

## 2022-07-18 DIAGNOSIS — L57.0 ACTINIC KERATOSIS: ICD-10-CM

## 2022-07-18 DIAGNOSIS — R07.89 FEELING OF CHEST TIGHTNESS: ICD-10-CM

## 2022-07-18 DIAGNOSIS — R42 DIZZINESS: ICD-10-CM

## 2022-07-18 DIAGNOSIS — Z00.00 ENCOUNTER FOR WELL ADULT EXAM WITHOUT ABNORMAL FINDINGS: Primary | ICD-10-CM

## 2022-07-18 DIAGNOSIS — L82.1 SEBORRHEIC KERATOSIS: ICD-10-CM

## 2022-07-18 PROCEDURE — 17000 DESTRUCT PREMALG LESION: CPT | Performed by: FAMILY MEDICINE

## 2022-07-18 PROCEDURE — 93000 ELECTROCARDIOGRAM COMPLETE: CPT | Performed by: FAMILY MEDICINE

## 2022-07-18 PROCEDURE — 99396 PREV VISIT EST AGE 40-64: CPT | Performed by: FAMILY MEDICINE

## 2022-07-18 PROCEDURE — 99213 OFFICE O/P EST LOW 20 MIN: CPT | Performed by: FAMILY MEDICINE

## 2022-07-18 PROCEDURE — 90471 IMMUNIZATION ADMIN: CPT | Performed by: FAMILY MEDICINE

## 2022-07-18 PROCEDURE — 90750 HZV VACC RECOMBINANT IM: CPT | Performed by: FAMILY MEDICINE

## 2022-07-18 ASSESSMENT — ENCOUNTER SYMPTOMS
NAUSEA: 0
VOMITING: 0
SHORTNESS OF BREATH: 0
EYE PAIN: 0
CHEST TIGHTNESS: 1
CONSTIPATION: 0
COUGH: 1
ABDOMINAL PAIN: 0

## 2022-07-18 NOTE — PROGRESS NOTES
Well Adult Note  Name: Clarence Farley Date: 2022   MRN: [id number] Sex: Male   Age: 57 y.o. Ethnicity: Non- / Non    : 1960 Race: White (non-)      Yue Durbin is here for well adult exam.  History:    Vertigo: First experienced in 2015. Went to [email or web address], experiencing when laying down on left side, never fallen. Its intermittent. Also experiences intermittent tinnitus. Has not happened in about 2 weeks. Did [employer] ride to remember (bike ride) this past weekend without any symptoms. Hearing: did have some high frequency hearing loss on previous audiology visit in 2019, states he things might have progressed. Would like to follow up with audiologist    Memory issues not remembering name or words, mother did have alzheimer. Had COVID and was treated with paxlovid, feeling better today. Does not complain of congestion, shortness of breath, fever. Fleeting chest tightness relieved with breathing, randomly once every month or two. No pain with exertion or lasting longer. Left face lesion less than 1 cm, seborrheic keratosis. Uncle that passed away from cancer. Trial of selsun blue or tea tree oil for the patch of dandruff on left temporal scalp behind the ear. 2-3 years ago lost about 15 lbs by cutting out sugar. Goes to dentist yearly, and eye doctor every 1-2 years. Goes to chiropractor and does stretching exercise any back discomfort. Review of Systems   Constitutional:  Negative for chills, fatigue and fever. HENT:  Positive for tinnitus (intermittent). Negative for ear pain. Eyes:  Negative for pain. Respiratory:  Positive for cough (lingering from COVID) and chest tightness (intermittent goes away with deep breathing). Negative for shortness of breath. Cardiovascular:  Negative for chest pain and leg swelling. Gastrointestinal:  Negative for abdominal pain, constipation, nausea and vomiting.      No Known Allergies      Prior to Visit Medications    Medication Sig Taking? Authorizing Provider   atorvastatin (LIPITOR) 40 MG tablet TAKE 1 TABLET BY MOUTH EVERY DAY Yes Kobe Escobar MD   FLUoxetine (PROZAC) 40 MG capsule TAKE 1 CAPSULE BY MOUTH EVERY DAY Yes Kobe Escobar MD   tamsulosin (FLOMAX) 0.4 MG capsule Take 1 capsule by mouth daily Yes Kobe Escobar MD   Cholecalciferol (VITAMIN D) 2000 units CAPS capsule Take by mouth daily Yes Historical Provider, MD   DAILY MULTIPLE VITAMINS TABS Take by mouth Yes Historical Provider, MD   Digestive Enzyme CAPS Take by mouth daily  Yes Historical Provider, MD   Lactobacillus (ACIDOPHILUS) 100 MG CAPS Take by mouth Yes Historical Provider, MD         Past Medical History:   Diagnosis Date    Depression     Hyperlipidemia     Irritable bowel syndrome (IBS)        Past Surgical History:   Procedure Laterality Date    APPENDECTOMY      TONSILLECTOMY           Family History   Problem Relation Age of Onset    Uterine Cancer Sister     Alzheimer's Disease Mother        Social History     Tobacco Use    Smoking status: Never    Smokeless tobacco: Never   Substance Use Topics    Alcohol use: No    Drug use: No       Objective   /70 (Site: Left Upper Arm, Position: Sitting)   Pulse 80   Resp 16   Ht 5' 6\" (1.676 m)   Wt 164 lb (74.4 kg)   SpO2 98%   BMI 26.47 kg/m²   Wt Readings from Last 3 Encounters:   07/18/22 164 lb (74.4 kg)   01/03/22 163 lb (73.9 kg)   08/13/21 157 lb 6.4 oz (71.4 kg)     There were no vitals filed for this visit. Physical Exam  Constitutional:       Appearance: Normal appearance. HENT:      Head: Normocephalic. Comments: Black: seborrheic keratosis  Ed: Patch of dandruff  Cardiovascular:      Rate and Rhythm: Normal rate and regular rhythm. Pulses: Normal pulses. Heart sounds: Normal heart sounds. No murmur heard. No gallop.    Pulmonary:      Effort: Pulmonary effort is normal.      Breath sounds: Normal breath sounds. No wheezing, rhonchi or rales. Abdominal:      General: There is no distension. Tenderness: There is no abdominal tenderness. Neurological:      General: No focal deficit present. Mental Status: He is alert. Psychiatric:         Mood and Affect: Mood normal.         Assessment   Plan   1. Encounter for well adult exam without abnormal findings  Here for yearly well visit exam/ physical, administered shingrix vaccine to address preventive care points. -     Zoster, SHINGRIX, (18 yrs +), IM  2. Dizziness  Previously has had intermittent dizziness/ vertigo. Exercises were given, and was previous was seen by [facility]. Interested in seeing audiology again. Also connected to tinnitus and hearing loss. Obtaining EKG to establish baseline and rule out cardiac causes of dizziness. Given exercises that were recommended by audiology and Dr. Hallie Busby previously: Dr Eliseo Hoffmann Vertigo video, arches tinnitus formula and Lipoflavinoid. If interested will, refer to vestibular physical therapy. -     [facility] - [address] Performed; Future  3. Tinnitus, unspecified laterality  See point two. Previously seen [facility]. MRI Brain in 2019, showed normal MRI of brain and [address] [facility]iology - [facility]. [facility]  4. Need for shingles vaccine  -     Zoster, SHINGRIX, (18 yrs +), IM  5. Actinic keratosis  [place] -     [id number] - CO DESTRUC PREMALIGNANT, FIRST LESION    Will follow up as needed and discussed with patient on contacting via mychart. 6. Feeling of chest tightness  Normal EKG. No exertional symptoms and biking for miles at a time. Indications for prompt return and/or emergent presentation if worsening reviewed in detail.    - EKG 12 Lead - Clinic Performed; Future  - EKG 12 Lead - Clinic Performed    7. Seborrheic keratosis  New diagnosis. Benign. Will follow.           Personalized Preventive Plan   Current Health Maintenance levels under 3.0 mmol/l or 115 mg/dl   Control blood glucose levels  Consider taking aspirin (75 mg daily), once blood pressure is controlled   Provided a follow up plan.

## 2022-07-18 NOTE — PROGRESS NOTES
After obtaining consent, and per orders of Dr. Cathy Geronimo, injection of Shingrix given in Left deltoid by Jayjay Turner MA. Patient instructed to remain in clinic for 20 minutes afterwards, and to report any adverse reaction to me immediately. Immunizations Administered       Name Date Dose Route    Zoster Recombinant (Shingrix) 7/18/2022 0.5 mL Intramuscular    Site: Deltoid- Left    Lot: [id number]    NDC: 38139-464-73          Vaccine checklist completed. ABN signed.  Patient tolerated injection well

## 2022-07-18 NOTE — PATIENT INSTRUCTIONS
medical condition or this instruction, always ask your healthcare professional. [place] [address] any warranty or liability for your use of this information.

## 2022-07-29 ENCOUNTER — HOSPITAL ENCOUNTER (OUTPATIENT)
Dept: AUDIOLOGY | Age: 62
Discharge: HOME | End: 2022-07-29
Payer: COMMERCIAL

## 2022-07-29 PROCEDURE — 92567 TYMPANOMETRY: CPT | Performed by: AUDIOLOGIST

## 2022-07-29 PROCEDURE — 92557 COMPREHENSIVE HEARING TEST: CPT | Performed by: AUDIOLOGIST

## 2022-07-29 NOTE — PROGRESS NOTES
AUDIOLOGICAL EVALUATION      REASON FOR TESTING:  Audiometric evaluation per the request of Dr. Diya Rai, due to the diagnosis of dizziness, tinnitus, and hearing loss. The patient is noticing increased hearing difficulties. He had noticed hearing loss for at least 3 years. He is having TV listening difficulties and hearing conversation in background noise is difficult. He does notice episodic tinnitus in each ear that can occur several times per week. He denies otalgia, ear pressure/fullness and history of noise exposure. A familiar history of hearing loss was reported. He continues to experience episodic vertigo. The episodes occur for several days once every couple of years. The episodes typically occur when he lays down and turns to his left side. He previously had an MRI completed to evaluate his vertigo and completed Physical Therapy for the vertigo. The therapy may have helped some. Previous audiometric testing completed 12/19/2018 indicated normal hearing through 3000 sloping to a mild to moderate high frequency sensorineural hearing loss in each ear. The high frequency loss is greater in the left ear than the right ear. OTOSCOPY: Clear      AUDIOGRAM          Reliability: Good    COMMENTS: Normal hearing at 250-1000 Hz sloping to a mild to a severe sensorineural hearing loss for the left ear. Normal hearing at 250-1000 Hz sloping to a moderately-severe sensorineural  hearing loss for the right ear. Word recognition ability is good at 94% for the left ear and good at 94% for the right ear. Increased hearing loss relative to previous testing. Tympanometry revealed normal peak pressure and high middle ear compliance for the left ear and a bi-fid tympanogram with normal peak pressure and high compliance for the left ear indicating a hyper-flaccid middle ear system for both ears. Tympanometry is consistent with previous testing.       RECOMMENDATION(S):   1- Follow up with Dr. Gwen Newberry regarding these results and any further testing or treatment recommendations. 2- Repeat testing as medically indicated or annually to monitor for progression, sooner with any significant changes or new concerns. 3- VNG testing could be considered to further evaluate the patient's vestibular function. 4- Binaural hearing aids are recommended pending medical clearance and patient motivation.

## 2022-10-20 ENCOUNTER — TELEPHONE (OUTPATIENT)
Dept: FAMILY MEDICINE CLINIC | Age: 62
End: 2022-10-20

## 2022-10-20 NOTE — TELEPHONE ENCOUNTER
Patient stopped in requesting the same medication that his wife was given for typhoid for travel.  Is this something that can be sent in for him

## 2022-10-24 DIAGNOSIS — Z71.84 ENCOUNTER FOR HEALTH COUNSELING RELATED TO TRAVEL: Primary | ICD-10-CM

## 2022-10-24 NOTE — TELEPHONE ENCOUNTER
I sent in oral typhoid vaccine for Laura Melissa. This should be good for 5 years. Please encourage him to also review CDC guidelines and confirm that the is up to date on other travel vaccines. If I remember they were not going to a malaria prone area or an area where yellow fever vaccination was needed. He should confirm that he is up to date on his hepatitis A and B vaccines. These are both series that last a lifetime. I only see one dose on file for him, though. If he needs additional doses of Hep A or B he can pop in for a travel appointment. He is also due for influenza vaccine and COVID-19 booster and could get these at the pharmacy when picking up the oral typhoid vaccine. Thanks!

## 2023-01-01 DIAGNOSIS — E78.5 HYPERLIPIDEMIA, UNSPECIFIED HYPERLIPIDEMIA TYPE: ICD-10-CM

## 2023-01-01 DIAGNOSIS — F32.A DEPRESSION, UNSPECIFIED DEPRESSION TYPE: ICD-10-CM

## 2023-01-06 NOTE — TELEPHONE ENCOUNTER
Patient's last appointment was : 7/18/2022  Patient's next appointment is : 1/12/2023  Last refilled:  7/5/22

## 2023-01-10 RX ORDER — ATORVASTATIN CALCIUM 40 MG/1
TABLET, FILM COATED ORAL
Qty: 90 TABLET | Refills: 1 | Status: SHIPPED | OUTPATIENT
Start: 2023-01-10 | End: 2023-01-12 | Stop reason: SDUPTHER

## 2023-01-10 RX ORDER — FLUOXETINE HYDROCHLORIDE 40 MG/1
CAPSULE ORAL
Qty: 90 CAPSULE | Refills: 1 | Status: SHIPPED | OUTPATIENT
Start: 2023-01-10 | End: 2023-01-12 | Stop reason: SDUPTHER

## 2023-01-12 ENCOUNTER — OFFICE VISIT (OUTPATIENT)
Dept: FAMILY MEDICINE CLINIC | Age: 63
End: 2023-01-12
Payer: COMMERCIAL

## 2023-01-12 VITALS
RESPIRATION RATE: 16 BRPM | BODY MASS INDEX: 26.03 KG/M2 | OXYGEN SATURATION: 96 % | SYSTOLIC BLOOD PRESSURE: 110 MMHG | HEIGHT: 66 IN | WEIGHT: 162 LBS | DIASTOLIC BLOOD PRESSURE: 60 MMHG | HEART RATE: 84 BPM

## 2023-01-12 DIAGNOSIS — F32.A DEPRESSION, UNSPECIFIED DEPRESSION TYPE: Primary | ICD-10-CM

## 2023-01-12 DIAGNOSIS — N40.1 BENIGN PROSTATIC HYPERPLASIA WITH LOWER URINARY TRACT SYMPTOMS, SYMPTOM DETAILS UNSPECIFIED: ICD-10-CM

## 2023-01-12 DIAGNOSIS — E78.5 HYPERLIPIDEMIA, UNSPECIFIED HYPERLIPIDEMIA TYPE: ICD-10-CM

## 2023-01-12 DIAGNOSIS — H91.8X9 ASYMMETRICAL HEARING LOSS: ICD-10-CM

## 2023-01-12 DIAGNOSIS — L82.1 SEBORRHEIC KERATOSIS: ICD-10-CM

## 2023-01-12 DIAGNOSIS — K58.9 IRRITABLE BOWEL SYNDROME, UNSPECIFIED TYPE: ICD-10-CM

## 2023-01-12 DIAGNOSIS — N40.0 BENIGN PROSTATIC HYPERPLASIA, UNSPECIFIED WHETHER LOWER URINARY TRACT SYMPTOMS PRESENT: ICD-10-CM

## 2023-01-12 LAB
ALBUMIN SERPL-MCNC: 4.2 G/DL (ref 3.5–5.1)
ALP BLD-CCNC: 66 U/L (ref 38–126)
ALT SERPL-CCNC: 34 U/L (ref 11–66)
ANION GAP SERPL CALCULATED.3IONS-SCNC: 11 MEQ/L (ref 8–16)
AST SERPL-CCNC: 32 U/L (ref 5–40)
BASOPHILS # BLD: 0.6 %
BASOPHILS ABSOLUTE: 0.1 THOU/MM3 (ref 0–0.1)
BILIRUB SERPL-MCNC: 0.4 MG/DL (ref 0.3–1.2)
BUN BLDV-MCNC: 18 MG/DL (ref 7–22)
CALCIUM SERPL-MCNC: 8.7 MG/DL (ref 8.5–10.5)
CHLORIDE BLD-SCNC: 103 MEQ/L (ref 98–111)
CHOLESTEROL, TOTAL: 174 MG/DL (ref 100–199)
CO2: 24 MEQ/L (ref 23–33)
CREAT SERPL-MCNC: 0.9 MG/DL (ref 0.4–1.2)
EOSINOPHIL # BLD: 1.5 %
EOSINOPHILS ABSOLUTE: 0.1 THOU/MM3 (ref 0–0.4)
ERYTHROCYTE [DISTWIDTH] IN BLOOD BY AUTOMATED COUNT: 13.2 % (ref 11.5–14.5)
ERYTHROCYTE [DISTWIDTH] IN BLOOD BY AUTOMATED COUNT: 48.2 FL (ref 35–45)
GFR SERPL CREATININE-BSD FRML MDRD: > 60 ML/MIN/1.73M2
GLUCOSE BLD-MCNC: 107 MG/DL (ref 70–108)
HCT VFR BLD CALC: 42.2 % (ref 42–52)
HDLC SERPL-MCNC: 62 MG/DL
HEMOGLOBIN: 13.5 GM/DL (ref 14–18)
IMMATURE GRANS (ABS): 0.02 THOU/MM3 (ref 0–0.07)
IMMATURE GRANULOCYTES: 0.2 %
LDL CHOLESTEROL CALCULATED: 92 MG/DL
LYMPHOCYTES # BLD: 21.3 %
LYMPHOCYTES ABSOLUTE: 1.9 THOU/MM3 (ref 1–4.8)
MCH RBC QN AUTO: 31.4 PG (ref 26–33)
MCHC RBC AUTO-ENTMCNC: 32 GM/DL (ref 32.2–35.5)
MCV RBC AUTO: 98.1 FL (ref 80–94)
MONOCYTES # BLD: 7.5 %
MONOCYTES ABSOLUTE: 0.7 THOU/MM3 (ref 0.4–1.3)
NUCLEATED RED BLOOD CELLS: 0 /100 WBC
PLATELET # BLD: 184 THOU/MM3 (ref 130–400)
PMV BLD AUTO: 10.9 FL (ref 9.4–12.4)
POTASSIUM SERPL-SCNC: 3.8 MEQ/L (ref 3.5–5.2)
RBC # BLD: 4.3 MILL/MM3 (ref 4.7–6.1)
SEG NEUTROPHILS: 68.9 %
SEGMENTED NEUTROPHILS ABSOLUTE COUNT: 6 THOU/MM3 (ref 1.8–7.7)
SODIUM BLD-SCNC: 138 MEQ/L (ref 135–145)
TOTAL PROTEIN: 7 G/DL (ref 6.1–8)
TRIGL SERPL-MCNC: 102 MG/DL (ref 0–199)
WBC # BLD: 8.7 THOU/MM3 (ref 4.8–10.8)

## 2023-01-12 PROCEDURE — 99214 OFFICE O/P EST MOD 30 MIN: CPT | Performed by: FAMILY MEDICINE

## 2023-01-12 PROCEDURE — 17110 DESTRUCTION B9 LES UP TO 14: CPT | Performed by: FAMILY MEDICINE

## 2023-01-12 PROCEDURE — G0444 DEPRESSION SCREEN ANNUAL: HCPCS | Performed by: FAMILY MEDICINE

## 2023-01-12 RX ORDER — TAMSULOSIN HYDROCHLORIDE 0.4 MG/1
0.4 CAPSULE ORAL DAILY
Qty: 90 CAPSULE | Refills: 1 | Status: SHIPPED | OUTPATIENT
Start: 2023-01-12

## 2023-01-12 RX ORDER — FLUOXETINE HYDROCHLORIDE 40 MG/1
CAPSULE ORAL
Qty: 90 CAPSULE | Refills: 1 | Status: SHIPPED | OUTPATIENT
Start: 2023-01-12

## 2023-01-12 RX ORDER — ATORVASTATIN CALCIUM 40 MG/1
TABLET, FILM COATED ORAL
Qty: 90 TABLET | Refills: 1 | Status: SHIPPED | OUTPATIENT
Start: 2023-01-12

## 2023-01-12 ASSESSMENT — PATIENT HEALTH QUESTIONNAIRE - PHQ9
9. THOUGHTS THAT YOU WOULD BE BETTER OFF DEAD, OR OF HURTING YOURSELF: 1
4. FEELING TIRED OR HAVING LITTLE ENERGY: 1
3. TROUBLE FALLING OR STAYING ASLEEP: 1
1. LITTLE INTEREST OR PLEASURE IN DOING THINGS: 1
6. FEELING BAD ABOUT YOURSELF - OR THAT YOU ARE A FAILURE OR HAVE LET YOURSELF OR YOUR FAMILY DOWN: 0
8. MOVING OR SPEAKING SO SLOWLY THAT OTHER PEOPLE COULD HAVE NOTICED. OR THE OPPOSITE, BEING SO FIGETY OR RESTLESS THAT YOU HAVE BEEN MOVING AROUND A LOT MORE THAN USUAL: 1
SUM OF ALL RESPONSES TO PHQ9 QUESTIONS 1 & 2: 2
SUM OF ALL RESPONSES TO PHQ QUESTIONS 1-9: 7
10. IF YOU CHECKED OFF ANY PROBLEMS, HOW DIFFICULT HAVE THESE PROBLEMS MADE IT FOR YOU TO DO YOUR WORK, TAKE CARE OF THINGS AT HOME, OR GET ALONG WITH OTHER PEOPLE: 1
7. TROUBLE CONCENTRATING ON THINGS, SUCH AS READING THE NEWSPAPER OR WATCHING TELEVISION: 1
SUM OF ALL RESPONSES TO PHQ QUESTIONS 1-9: 6
2. FEELING DOWN, DEPRESSED OR HOPELESS: 1
5. POOR APPETITE OR OVEREATING: 0
SUM OF ALL RESPONSES TO PHQ QUESTIONS 1-9: 7
SUM OF ALL RESPONSES TO PHQ QUESTIONS 1-9: 7

## 2023-01-12 ASSESSMENT — COLUMBIA-SUICIDE SEVERITY RATING SCALE - C-SSRS
6. HAVE YOU EVER DONE ANYTHING, STARTED TO DO ANYTHING, OR PREPARED TO DO ANYTHING TO END YOUR LIFE?: NO
1. WITHIN THE PAST MONTH, HAVE YOU WISHED YOU WERE DEAD OR WISHED YOU COULD GO TO SLEEP AND NOT WAKE UP?: NO
2. HAVE YOU ACTUALLY HAD ANY THOUGHTS OF KILLING YOURSELF?: NO

## 2023-01-12 ASSESSMENT — ENCOUNTER SYMPTOMS
CONSTIPATION: 0
ABDOMINAL PAIN: 0
VOMITING: 0
WHEEZING: 0
SHORTNESS OF BREATH: 0
STRIDOR: 0
COUGH: 0
DIARRHEA: 0
NAUSEA: 0

## 2023-01-12 NOTE — PROGRESS NOTES
Kaley Li (:  1960) is a 60 y.o. male,Established patient, here for evaluation of the following chief complaint(s):  6 Month Follow-Up and Skin Problem (Left upper check- growing in size- )         ASSESSMENT/PLAN:  1. Depression, unspecified depression type  -     Comprehensive Metabolic Panel; Future  -     [place] DEPRESSION SCREEN ANNUAL  -     FLUoxetine (PROZAC) 40 MG capsule; TAKE 1 CAPSULE BY MOUTH EVERY DAY, Disp-90 capsule, R-1Normal  2. Hyperlipidemia, unspecified hyperlipidemia type  -     Lipid Panel; Future  -     atorvastatin (LIPITOR) 40 MG tablet; TAKE 1 TABLET BY MOUTH EVERY DAY, Disp-90 tablet, R-1Normal  3. Seborrheic keratosis  -     10562 - [place] DESTRUCTION BENIGN LESIONS UP TO 14  4. Benign prostatic hyperplasia with lower urinary tract symptoms, symptom details unspecified  -     Comprehensive Metabolic Panel; Future  -     CBC with Auto Differential; Future  -     PSA Prostatic Specific Antigen; Future  5. Asymmetrical hearing loss  6. Irritable bowel syndrome, unspecified type  -     Tissue Transglutaminase, IgA; Future  -     IgA; Future  7. Benign prostatic hyperplasia, unspecified whether lower urinary tract symptoms present  -     tamsulosin (FLOMAX) 0.4 MG capsule; Take 1 capsule by mouth daily, Disp-90 capsule, R-1Normal    Labs for PSA,  Work up for Celiac /TTG  SK frozen x 7  Refilled atorvastatin, recheck lipids  Refilled prozac, mood stable. Return in about 6 months (around 2023) for Follow-up chronic conditions. Subjective   SUBJECTIVE/OBJECTIVE:  HPI    Pt is a 59 y/o male w/ PMH IBS, Hearing loss, AK, SebK, BPH, HLD, MDD presenting for 6 mo f/u and skin problem.  Regarding chronic conditons: BPH on flomax, doing well, on flomax, Mood on prozac 40, doing well and stable, no changes in mood or behavior no si hi. 1/7 days of anhedonia not, IBS on lactobacillus, and digestive enzyem caps, has changed diet to be gluten free and symptoms have doing better. Regarding skin AK has sloughed off, however noted worsening of SK, Pt is amicable to freezing today. Procedure freeze seborrhoic keratoses:    Verrucous lesions identified (see exam above). Patient was counseled on risk of procedure including bleeding, infection, blister formation, scarring, lesion recurrence, progression to squamous cell carcinoma, and possible need for repeat procedure and benefits of procedure including possible lesion resolution. Consent signed. Each lesion frozen with liquid nitrogen until ice ball formed x 3. Patient tolerated procedure well without complication. No blood loss. Counseled on post procedure care. Review of Systems   Constitutional:  Negative for activity change, appetite change, chills, diaphoresis, fatigue and fever. Respiratory:  Negative for cough, shortness of breath, wheezing and stridor. Cardiovascular:  Negative for chest pain, palpitations and leg swelling. Gastrointestinal:  Negative for abdominal pain, constipation, diarrhea, nausea and vomiting. Genitourinary:  Negative for decreased urine volume, dysuria, flank pain, frequency, hematuria and urgency. Musculoskeletal:  Negative for gait problem and myalgias. Skin:  Negative for pallor, rash and wound. Neurological:  Negative for dizziness, tremors, seizures, syncope, weakness, light-headedness, numbness and headaches. Psychiatric/Behavioral:  Negative for behavioral problems, confusion, decreased concentration, dysphoric mood, self-injury, sleep disturbance and suicidal ideas. Objective     Vitals:    01/12/23 1430   BP: 110/60   Site: Left Upper Arm   Position: Sitting   Pulse: 84   Resp: 16   SpO2: 96%   Weight: 162 lb (73.5 kg)   Height: 5' 6\" (1.676 m)      Physical Exam  Constitutional:       General: He is not in acute distress. Appearance: Normal appearance. He is normal weight. He is not ill-appearing or diaphoretic.    HENT:      Head: Normocephalic and atraumatic. Nose: Nose normal. No congestion or rhinorrhea. Mouth/Throat:      Mouth: Mucous membranes are moist.      Pharynx: Oropharynx is clear. No oropharyngeal exudate or posterior oropharyngeal erythema. Cardiovascular:      Rate and Rhythm: Normal rate. Pulses: Normal pulses. Heart sounds: Normal heart sounds. No murmur heard. No friction rub. No gallop. Pulmonary:      Effort: Pulmonary effort is normal. No respiratory distress. Breath sounds: Normal breath sounds. No wheezing, rhonchi or rales. Abdominal:      General: Abdomen is flat. Bowel sounds are normal. There is no distension. Palpations: Abdomen is soft. Tenderness: There is no abdominal tenderness. There is no guarding or rebound. Musculoskeletal:         General: No swelling, tenderness or signs of injury. Right lower leg: No edema. Left lower leg: No edema. Skin:     General: Skin is warm and dry. Capillary Refill: Capillary refill takes less than 2 seconds. Coloration: Skin is not jaundiced or pale. Findings: Lesion present. No erythema or rash. Comments: Mutliple SK rashes as noted in pictures below ranging in size. Neurological:      General: No focal deficit present. Mental Status: He is alert and oriented to person, place, and time. Cranial Nerves: No cranial nerve deficit. Motor: No weakness. Gait: Gait normal.   Psychiatric:         Mood and Affect: Mood normal. Mood is not anxious, depressed or elated. Affect is not labile, flat or angry. Speech: Speech normal. Speech is not delayed or slurred. Behavior: Behavior normal. Behavior is not agitated, aggressive or withdrawn. Thought Content: Thought content normal. Thought content does not include homicidal or suicidal ideation. Thought content does not include homicidal or suicidal plan.                         On this date 1/12/2023 I have spent 60 minutes reviewing previous notes, test results and face to face with the patient discussing the diagnosis and importance of compliance with the treatment plan as well as documenting on the day of the visit. An electronic signature was used to authenticate this note.     --Nona Curran MD

## 2023-01-12 NOTE — PROGRESS NOTES
Attending attestation:  I personally performed and participated key or critical portions of the evaluation and management including personally performing the exam and medical decision making. I verify the accuracy of the documentation by the resident with the following addition or changes: Mood stable. Due for repeat PSA with labs. Stable on flomax. Recently went gluten free with improvement on symptoms. Will check for celiac disease. Cholesterol stable on statin. Multiple seborrheic keratoses on exam.  Growing larger and would like frozen today. One on left cheek, one on right forearm, three on right mid-back, one large, and two on left low back. Procedure freeze seborrheic keratoses:    Seborrheic keratosis lesions identified (see exam above). Patient was counseled on risk of procedure including bleeding, infection, blister formation, scarring, lesion recurrence, progression to squamous cell carcinoma, and possible need for repeat procedure and benefits of procedure including possible lesion resolution. Consent signed. Each lesion frozen with liquid nitrogen until ice ball formed x 3. Patient tolerated procedure well without complication. No blood loss. Counseled on post procedure care.          Electronically signed by Benoit Blank MD on 1/12/2023 at 2:58 PM

## 2023-01-13 LAB
IGA: 399 MG/DL (ref 70–400)
PROSTATE SPECIFIC ANTIGEN: 0.96 NG/ML (ref 0–1)

## 2023-03-06 ENCOUNTER — TELEPHONE (OUTPATIENT)
Dept: FAMILY MEDICINE CLINIC | Age: 63
End: 2023-03-06

## 2023-04-03 DIAGNOSIS — E78.5 HYPERLIPIDEMIA, UNSPECIFIED HYPERLIPIDEMIA TYPE: ICD-10-CM

## 2023-04-03 RX ORDER — ATORVASTATIN CALCIUM 40 MG/1
TABLET, FILM COATED ORAL
Qty: 90 TABLET | Refills: 1 | OUTPATIENT
Start: 2023-04-03

## 2023-08-03 ENCOUNTER — OFFICE VISIT (OUTPATIENT)
Dept: FAMILY MEDICINE CLINIC | Age: 63
End: 2023-08-03
Payer: COMMERCIAL

## 2023-08-03 ENCOUNTER — TELEPHONE (OUTPATIENT)
Dept: FAMILY MEDICINE CLINIC | Age: 63
End: 2023-08-03

## 2023-08-03 VITALS
HEART RATE: 66 BPM | BODY MASS INDEX: 26.2 KG/M2 | HEIGHT: 66 IN | OXYGEN SATURATION: 97 % | DIASTOLIC BLOOD PRESSURE: 66 MMHG | SYSTOLIC BLOOD PRESSURE: 106 MMHG | RESPIRATION RATE: 16 BRPM | WEIGHT: 163 LBS

## 2023-08-03 DIAGNOSIS — E78.5 HYPERLIPIDEMIA, UNSPECIFIED HYPERLIPIDEMIA TYPE: ICD-10-CM

## 2023-08-03 DIAGNOSIS — Z13.1 DIABETES MELLITUS SCREENING: Primary | ICD-10-CM

## 2023-08-03 DIAGNOSIS — Z12.11 SCREEN FOR COLON CANCER: ICD-10-CM

## 2023-08-03 DIAGNOSIS — N40.0 BENIGN PROSTATIC HYPERPLASIA, UNSPECIFIED WHETHER LOWER URINARY TRACT SYMPTOMS PRESENT: ICD-10-CM

## 2023-08-03 DIAGNOSIS — F33.42 RECURRENT MAJOR DEPRESSIVE DISORDER, IN FULL REMISSION (HCC): ICD-10-CM

## 2023-08-03 DIAGNOSIS — L98.9 SKIN LESION: ICD-10-CM

## 2023-08-03 PROBLEM — F33.9 MAJOR DEPRESSIVE DISORDER, RECURRENT, UNSPECIFIED (HCC): Status: ACTIVE | Noted: 2023-08-03

## 2023-08-03 PROBLEM — F33.0 MAJOR DEPRESSIVE DISORDER, RECURRENT, MILD (HCC): Status: ACTIVE | Noted: 2023-08-03

## 2023-08-03 PROBLEM — F33.9 MAJOR DEPRESSIVE DISORDER, RECURRENT, UNSPECIFIED (HCC): Status: RESOLVED | Noted: 2023-08-03 | Resolved: 2023-08-03

## 2023-08-03 PROBLEM — F32.5 MAJOR DEPRESSION IN REMISSION (HCC): Status: ACTIVE | Noted: 2023-08-03

## 2023-08-03 PROBLEM — F33.1 MAJOR DEPRESSIVE DISORDER, RECURRENT, MODERATE (HCC): Status: RESOLVED | Noted: 2023-08-03 | Resolved: 2023-08-03

## 2023-08-03 PROBLEM — F33.1 MAJOR DEPRESSIVE DISORDER, RECURRENT, MODERATE (HCC): Status: ACTIVE | Noted: 2023-08-03

## 2023-08-03 LAB — HBA1C MFR BLD: 5.4 %

## 2023-08-03 PROCEDURE — 83037 HB GLYCOSYLATED A1C HOME DEV: CPT | Performed by: FAMILY MEDICINE

## 2023-08-03 PROCEDURE — 99214 OFFICE O/P EST MOD 30 MIN: CPT | Performed by: FAMILY MEDICINE

## 2023-08-03 RX ORDER — TAMSULOSIN HYDROCHLORIDE 0.4 MG/1
0.4 CAPSULE ORAL DAILY
Qty: 90 CAPSULE | Refills: 1 | Status: SHIPPED | OUTPATIENT
Start: 2023-08-03

## 2023-08-03 RX ORDER — FLUOXETINE HYDROCHLORIDE 40 MG/1
CAPSULE ORAL
Qty: 90 CAPSULE | Refills: 1 | Status: SHIPPED | OUTPATIENT
Start: 2023-08-03

## 2023-08-03 RX ORDER — ATORVASTATIN CALCIUM 40 MG/1
TABLET, FILM COATED ORAL
Qty: 90 TABLET | Refills: 1 | Status: SHIPPED | OUTPATIENT
Start: 2023-08-03

## 2023-08-03 SDOH — ECONOMIC STABILITY: FOOD INSECURITY: WITHIN THE PAST 12 MONTHS, YOU WORRIED THAT YOUR FOOD WOULD RUN OUT BEFORE YOU GOT MONEY TO BUY MORE.: NEVER TRUE

## 2023-08-03 SDOH — ECONOMIC STABILITY: INCOME INSECURITY: HOW HARD IS IT FOR YOU TO PAY FOR THE VERY BASICS LIKE FOOD, HOUSING, MEDICAL CARE, AND HEATING?: NOT HARD AT ALL

## 2023-08-03 SDOH — ECONOMIC STABILITY: FOOD INSECURITY: WITHIN THE PAST 12 MONTHS, THE FOOD YOU BOUGHT JUST DIDN'T LAST AND YOU DIDN'T HAVE MONEY TO GET MORE.: NEVER TRUE

## 2023-08-03 SDOH — ECONOMIC STABILITY: HOUSING INSECURITY
IN THE LAST 12 MONTHS, WAS THERE A TIME WHEN YOU DID NOT HAVE A STEADY PLACE TO SLEEP OR SLEPT IN A SHELTER (INCLUDING NOW)?: NO

## 2023-08-03 ASSESSMENT — ENCOUNTER SYMPTOMS
VOMITING: 0
ABDOMINAL PAIN: 0
BACK PAIN: 0
EYE DISCHARGE: 0
ROS SKIN COMMENTS: SKIN LESIONS.
CONSTIPATION: 0
SORE THROAT: 0
COUGH: 0
NAUSEA: 0
EYE REDNESS: 0
DIARRHEA: 0
SHORTNESS OF BREATH: 0

## 2023-08-03 NOTE — TELEPHONE ENCOUNTER
----- Message from Altagracia Mckeon MD sent at 8/3/2023 10:28 AM EDT -----  Good news normal blood sugar. NO diabetes or prediabetes.

## 2023-08-03 NOTE — PROGRESS NOTES
[address] [address]  Dept: [phone]  Dept Fax: [phone]  Loc: [phone]      Lenny Stoner is a 60 y.o. male who presents todayfor his medical conditions/complaints as noted below. Lenny Stoner is c/o of 6 Month Follow-Up (Depression- feeling pretty good /) and Skin Problem (Several moles to look at )      :     HPI    Here for follow-up. Mood okay. Celiac test was negative    Cholesterol stable on statin. Several moles to look at today. Cut finger a couple days ago. Healing. Wants this looked at. Gave fit test for colon cancer screen. Recent trip to [place]. Did get a tick during one trip but got this off right away and not symptoms. Had symptoms in ear that went away with home wax removal.    Patient Active Problem List   Diagnosis    Irritable bowel syndrome (IBS)    Hyperlipidemia    Depression    Actinic keratosis    Plantar wart of right foot    Asymmetrical hearing loss    Benign prostatic hyperplasia with lower urinary tract symptoms    Low serum vitamin D    Memory changes    Major depression in remission ([address])      Goals    None       The patient has No Known Allergies. Medical History  Alex Johnson has a past medical history of Depression, Hyperlipidemia, and Irritable bowel syndrome (IBS). Past SurgicalHistory  The patient  has a past surgical history that includes Appendectomy and Tonsillectomy. Family History  This patient's family history includes Alzheimer's Disease in his mother; Uterine Cancer in his sister. Social History  Alex Johnson  reports that he has never smoked. He has never used smokeless tobacco. He reports that he does not drink alcohol and does not use drugs.     Medications    Current Outpatient Medications:     atorvastatin (LIPITOR) 40 MG tablet, TAKE 1 TABLET BY MOUTH EVERY DAY, Disp: 90 tablet, Rfl: 1    FLUoxetine (PROZAC) 40 MG capsule, TAKE 1

## 2023-08-07 ENCOUNTER — TELEPHONE (OUTPATIENT)
Dept: FAMILY MEDICINE CLINIC | Age: 63
End: 2023-08-07

## 2023-08-07 DIAGNOSIS — L03.019 CELLULITIS OF FINGER, UNSPECIFIED LATERALITY: Primary | ICD-10-CM

## 2023-08-07 RX ORDER — SULFAMETHOXAZOLE AND TRIMETHOPRIM 800; 160 MG/1; MG/1
1 TABLET ORAL 2 TIMES DAILY
Qty: 14 TABLET | Refills: 0 | Status: SHIPPED | OUTPATIENT
Start: 2023-08-07 | End: 2023-08-14

## 2023-08-07 NOTE — TELEPHONE ENCOUNTER
Chatted with patient via telephone. He is traveling but returns to [facility] later today. He reports that the finger injury from prior appointment is looking infected and draining pus. No fevers. Photograph shows a 1 cm area of erythema around wound. No streaking redness. Will start oral antibiotic for 1 week. Indications for prompt return and/or emergent presentation if worsening reviewed in detail. Can also use topical antibiotic ointment and bandage. Appreciated advice.

## 2023-09-14 ENCOUNTER — COMMUNITY OUTREACH (OUTPATIENT)
Dept: FAMILY MEDICINE CLINIC | Age: 63
End: 2023-09-14

## 2023-12-06 DIAGNOSIS — Z12.11 SCREEN FOR COLON CANCER: ICD-10-CM

## 2023-12-06 LAB
CONTROL: NORMAL
FECAL BLOOD IMMUNOCHEMICAL TEST: NEGATIVE

## 2023-12-06 PROCEDURE — 82274 ASSAY TEST FOR BLOOD FECAL: CPT | Performed by: FAMILY MEDICINE

## 2024-02-01 ENCOUNTER — OFFICE VISIT (OUTPATIENT)
Dept: FAMILY MEDICINE CLINIC | Age: 64
End: 2024-02-01
Payer: COMMERCIAL

## 2024-02-01 VITALS
RESPIRATION RATE: 16 BRPM | OXYGEN SATURATION: 96 % | HEIGHT: 66 IN | HEART RATE: 76 BPM | WEIGHT: 161 LBS | BODY MASS INDEX: 25.88 KG/M2 | DIASTOLIC BLOOD PRESSURE: 64 MMHG | SYSTOLIC BLOOD PRESSURE: 110 MMHG

## 2024-02-01 DIAGNOSIS — E78.5 HYPERLIPIDEMIA, UNSPECIFIED HYPERLIPIDEMIA TYPE: ICD-10-CM

## 2024-02-01 DIAGNOSIS — R06.83 SNORING: ICD-10-CM

## 2024-02-01 DIAGNOSIS — D64.9 ANEMIA, UNSPECIFIED TYPE: ICD-10-CM

## 2024-02-01 DIAGNOSIS — N40.0 BENIGN PROSTATIC HYPERPLASIA, UNSPECIFIED WHETHER LOWER URINARY TRACT SYMPTOMS PRESENT: ICD-10-CM

## 2024-02-01 DIAGNOSIS — F33.42 RECURRENT MAJOR DEPRESSIVE DISORDER, IN FULL REMISSION (HCC): ICD-10-CM

## 2024-02-01 DIAGNOSIS — I95.1 ORTHOSTATIC HYPOTENSION: Primary | ICD-10-CM

## 2024-02-01 LAB
ALBUMIN SERPL BCG-MCNC: 4.1 G/DL (ref 3.5–5.1)
ALP SERPL-CCNC: 62 U/L (ref 38–126)
ALT SERPL W/O P-5'-P-CCNC: 31 U/L (ref 11–66)
ANION GAP SERPL CALC-SCNC: 10 MEQ/L (ref 8–16)
AST SERPL-CCNC: 30 U/L (ref 5–40)
BASOPHILS ABSOLUTE: 0.1 THOU/MM3 (ref 0–0.1)
BASOPHILS NFR BLD AUTO: 0.8 %
BILIRUB SERPL-MCNC: 0.6 MG/DL (ref 0.3–1.2)
BUN SERPL-MCNC: 11 MG/DL (ref 7–22)
CALCIUM SERPL-MCNC: 9 MG/DL (ref 8.5–10.5)
CHLORIDE SERPL-SCNC: 100 MEQ/L (ref 98–111)
CHOLESTEROL, FASTING: 166 MG/DL (ref 100–199)
CO2 SERPL-SCNC: 25 MEQ/L (ref 23–33)
CREAT SERPL-MCNC: 0.8 MG/DL (ref 0.4–1.2)
DEPRECATED RDW RBC AUTO: 46.2 FL (ref 35–45)
EOSINOPHIL NFR BLD AUTO: 1.4 %
EOSINOPHILS ABSOLUTE: 0.1 THOU/MM3 (ref 0–0.4)
ERYTHROCYTE [DISTWIDTH] IN BLOOD BY AUTOMATED COUNT: 12.9 % (ref 11.5–14.5)
GFR SERPL CREATININE-BSD FRML MDRD: > 60 ML/MIN/1.73M2
GLUCOSE FASTING: 61 MG/DL (ref 70–108)
HCT VFR BLD AUTO: 42.2 % (ref 42–52)
HDLC SERPL-MCNC: 57 MG/DL
HGB BLD-MCNC: 13.8 GM/DL (ref 14–18)
IMM GRANULOCYTES # BLD AUTO: 0.03 THOU/MM3 (ref 0–0.07)
IMM GRANULOCYTES NFR BLD AUTO: 0.5 %
IRON SERPL-MCNC: 113 UG/DL (ref 65–195)
LDLC SERPL CALC-MCNC: 92 MG/DL
LYMPHOCYTES ABSOLUTE: 1.3 THOU/MM3 (ref 1–4.8)
LYMPHOCYTES NFR BLD AUTO: 20.5 %
MCH RBC QN AUTO: 31.7 PG (ref 26–33)
MCHC RBC AUTO-ENTMCNC: 32.7 GM/DL (ref 32.2–35.5)
MCV RBC AUTO: 97 FL (ref 80–94)
MONOCYTES ABSOLUTE: 0.4 THOU/MM3 (ref 0.4–1.3)
MONOCYTES NFR BLD AUTO: 6.9 %
NEUTROPHILS NFR BLD AUTO: 69.9 %
NRBC BLD AUTO-RTO: 0 /100 WBC
PLATELET # BLD AUTO: 176 THOU/MM3 (ref 130–400)
PMV BLD AUTO: 11.2 FL (ref 9.4–12.4)
POTASSIUM SERPL-SCNC: 4 MEQ/L (ref 3.5–5.2)
PROT SERPL-MCNC: 6.7 G/DL (ref 6.1–8)
RBC # BLD AUTO: 4.35 MILL/MM3 (ref 4.7–6.1)
SEGMENTED NEUTROPHILS ABSOLUTE COUNT: 4.5 THOU/MM3 (ref 1.8–7.7)
SODIUM SERPL-SCNC: 135 MEQ/L (ref 135–145)
TRIGLYCERIDE, FASTING: 87 MG/DL (ref 0–199)
WBC # BLD AUTO: 6.4 THOU/MM3 (ref 4.8–10.8)

## 2024-02-01 PROCEDURE — 99214 OFFICE O/P EST MOD 30 MIN: CPT | Performed by: FAMILY MEDICINE

## 2024-02-01 RX ORDER — TAMSULOSIN HYDROCHLORIDE 0.4 MG/1
0.4 CAPSULE ORAL DAILY
Qty: 90 CAPSULE | Refills: 1 | Status: SHIPPED | OUTPATIENT
Start: 2024-02-01

## 2024-02-01 RX ORDER — ATORVASTATIN CALCIUM 40 MG/1
TABLET, FILM COATED ORAL
Qty: 90 TABLET | Refills: 1 | Status: SHIPPED | OUTPATIENT
Start: 2024-02-01

## 2024-02-01 RX ORDER — FLUOXETINE HYDROCHLORIDE 40 MG/1
CAPSULE ORAL
Qty: 90 CAPSULE | Refills: 1 | Status: SHIPPED | OUTPATIENT
Start: 2024-02-01

## 2024-02-01 ASSESSMENT — PATIENT HEALTH QUESTIONNAIRE - PHQ9
10. IF YOU CHECKED OFF ANY PROBLEMS, HOW DIFFICULT HAVE THESE PROBLEMS MADE IT FOR YOU TO DO YOUR WORK, TAKE CARE OF THINGS AT HOME, OR GET ALONG WITH OTHER PEOPLE: 1
3. TROUBLE FALLING OR STAYING ASLEEP: 0
SUM OF ALL RESPONSES TO PHQ QUESTIONS 1-9: 5
2. FEELING DOWN, DEPRESSED OR HOPELESS: 1
6. FEELING BAD ABOUT YOURSELF - OR THAT YOU ARE A FAILURE OR HAVE LET YOURSELF OR YOUR FAMILY DOWN: 1
8. MOVING OR SPEAKING SO SLOWLY THAT OTHER PEOPLE COULD HAVE NOTICED. OR THE OPPOSITE, BEING SO FIGETY OR RESTLESS THAT YOU HAVE BEEN MOVING AROUND A LOT MORE THAN USUAL: 1
5. POOR APPETITE OR OVEREATING: 0
7. TROUBLE CONCENTRATING ON THINGS, SUCH AS READING THE NEWSPAPER OR WATCHING TELEVISION: 1
9. THOUGHTS THAT YOU WOULD BE BETTER OFF DEAD, OR OF HURTING YOURSELF: 0
SUM OF ALL RESPONSES TO PHQ QUESTIONS 1-9: 5
SUM OF ALL RESPONSES TO PHQ9 QUESTIONS 1 & 2: 1
SUM OF ALL RESPONSES TO PHQ QUESTIONS 1-9: 5
4. FEELING TIRED OR HAVING LITTLE ENERGY: 1
1. LITTLE INTEREST OR PLEASURE IN DOING THINGS: 0
SUM OF ALL RESPONSES TO PHQ QUESTIONS 1-9: 5

## 2024-02-01 ASSESSMENT — ENCOUNTER SYMPTOMS
NAUSEA: 0
PHOTOPHOBIA: 0
DIARRHEA: 0
COUGH: 0
WHEEZING: 0
ABDOMINAL PAIN: 0
CONSTIPATION: 0
VOMITING: 0
BLOOD IN STOOL: 0

## 2024-02-02 LAB
FERRITIN SERPL IA-MCNC: 92 NG/ML (ref 22–322)
FOLATE SERPL-MCNC: 16.7 NG/ML (ref 4.8–24.2)
IRON SATN MFR SERPL: 35 % (ref 20–50)
PSA SERPL-MCNC: 1.09 NG/ML (ref 0–1)
TIBC SERPL-MCNC: 325 UG/DL (ref 171–450)
VIT B12 SERPL-MCNC: 739 PG/ML (ref 211–911)

## 2024-03-04 ENCOUNTER — TELEPHONE (OUTPATIENT)
Dept: PULMONOLOGY | Age: 64
End: 2024-03-04

## 2024-03-04 ENCOUNTER — OFFICE VISIT (OUTPATIENT)
Dept: PULMONOLOGY | Age: 64
End: 2024-03-04
Payer: COMMERCIAL

## 2024-03-04 VITALS
TEMPERATURE: 98.2 F | OXYGEN SATURATION: 94 % | BODY MASS INDEX: 26.03 KG/M2 | WEIGHT: 162 LBS | DIASTOLIC BLOOD PRESSURE: 74 MMHG | SYSTOLIC BLOOD PRESSURE: 122 MMHG | HEIGHT: 66 IN | HEART RATE: 66 BPM

## 2024-03-04 DIAGNOSIS — G47.52 DREAM ENACTMENT BEHAVIOR: Primary | ICD-10-CM

## 2024-03-04 DIAGNOSIS — R06.81 WITNESSED EPISODE OF APNEA: ICD-10-CM

## 2024-03-04 DIAGNOSIS — R06.83 LOUD SNORING: ICD-10-CM

## 2024-03-04 DIAGNOSIS — R40.0 EXCESSIVE SLEEPINESS WHILE DRIVING: ICD-10-CM

## 2024-03-04 PROCEDURE — 99204 OFFICE O/P NEW MOD 45 MIN: CPT | Performed by: INTERNAL MEDICINE
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
## 2024-03-08 ENCOUNTER — OFFICE VISIT (OUTPATIENT)
Dept: UROLOGY | Age: 64
End: 2024-03-08
Payer: COMMERCIAL

## 2024-03-08 VITALS — WEIGHT: 162 LBS | BODY MASS INDEX: 26.03 KG/M2 | HEIGHT: 66 IN | RESPIRATION RATE: 14 BRPM

## 2024-03-08 DIAGNOSIS — N40.1 BENIGN PROSTATIC HYPERPLASIA WITH LOWER URINARY TRACT SYMPTOMS, SYMPTOM DETAILS UNSPECIFIED: Primary | ICD-10-CM

## 2024-03-08 PROCEDURE — 99204 OFFICE O/P NEW MOD 45 MIN: CPT | Performed by: UROLOGY

## 2024-03-08 RX ORDER — DOXYCYCLINE 100 MG/1
100 CAPSULE ORAL 2 TIMES DAILY
Qty: 14 CAPSULE | Refills: 0 | Status: SHIPPED | OUTPATIENT
Start: 2024-03-08 | End: 2024-03-15

## 2024-03-08 NOTE — PROGRESS NOTES
Trevor Gallegos MD.    [facility]  [facility]  [address]  [address]  [facility] [address]  Dept: [phone]  Dept Fax: [phone]  Loc: [phone]    [facility] Office Note -     Patient:  Elvis Talbot  YOB: 1960    The patient is a 63 y.o. male who presents today for evaluation of the following problems:   Chief Complaint   Patient presents with    New Patient    Benign Prostatic Hypertrophy    referred/consultation requested by Xiomara Iyer MD.    History of Present Illness:    BPH  Worsening  Has been on flomax for years  Auass 18- freq, weak stream- mostly dissatisfied  Stopped flomax due to dizziness-- dizziness improved a bit but voiding has worsened.      Requested/reviewed records from Xiomara Iyer MD office and/or outside physician/EMR    (Patient's old records have been requested, reviewed and pertinent findings summarized in today's note.)    Procedures Today: N/A      Last several PSA's:  Lab Results   Component Value Date    PSA 1.09 (H) 02/01/2024    PSA 0.96 01/12/2023    PSA 0.98 02/15/2022       Last total testosterone:  No results found for: \"TESTOSTERONE\"    Urinalysis today:  No results found for this visit on 03/08/24.    Last BUN and creatinine:  Lab Results   Component Value Date    BUN 11 02/01/2024     Lab Results   Component Value Date    CREATININE 0.8 02/01/2024         Imaging Reviewed during this Office Visit:   TREVOR GALLEGOS MD independently reviewed the images and verified the radiology reports from:    No results found.    PAST MEDICAL, FAMILY AND SOCIAL HISTORY:  Past Medical History:   Diagnosis Date    Asthma 1967    Depression     GERD (gastroesophageal reflux disease) 4/1/1983    Hearing loss 2018    Hyperlipidemia     Irritable bowel syndrome (IBS)      Past Surgical History:   Procedure Laterality Date    APPENDECTOMY      TONSILLECTOMY       Family History   Problem Relation Age of Onset

## 2024-06-06 ENCOUNTER — TELEPHONE (OUTPATIENT)
Dept: FAMILY MEDICINE CLINIC | Age: 64
End: 2024-06-06

## 2024-06-06 ENCOUNTER — OFFICE VISIT (OUTPATIENT)
Dept: FAMILY MEDICINE CLINIC | Age: 64
End: 2024-06-06

## 2024-06-06 VITALS
OXYGEN SATURATION: 95 % | DIASTOLIC BLOOD PRESSURE: 86 MMHG | WEIGHT: 161.8 LBS | RESPIRATION RATE: 18 BRPM | HEIGHT: 66 IN | SYSTOLIC BLOOD PRESSURE: 118 MMHG | HEART RATE: 71 BPM | BODY MASS INDEX: 26 KG/M2

## 2024-06-06 DIAGNOSIS — R41.3 MEMORY CHANGE: ICD-10-CM

## 2024-06-06 DIAGNOSIS — H93.8X2 POPPING OF LEFT EAR: ICD-10-CM

## 2024-06-06 DIAGNOSIS — F33.42 RECURRENT MAJOR DEPRESSIVE DISORDER, IN FULL REMISSION (HCC): ICD-10-CM

## 2024-06-06 DIAGNOSIS — E78.5 HYPERLIPIDEMIA, UNSPECIFIED HYPERLIPIDEMIA TYPE: ICD-10-CM

## 2024-06-06 DIAGNOSIS — Z23 NEED FOR TDAP VACCINATION: Primary | ICD-10-CM

## 2024-06-06 DIAGNOSIS — N40.0 BENIGN PROSTATIC HYPERPLASIA, UNSPECIFIED WHETHER LOWER URINARY TRACT SYMPTOMS PRESENT: ICD-10-CM

## 2024-06-06 DIAGNOSIS — D64.9 ANEMIA, UNSPECIFIED TYPE: ICD-10-CM

## 2024-06-06 DIAGNOSIS — I95.1 ORTHOSTATIC HYPOTENSION: ICD-10-CM

## 2024-06-06 DIAGNOSIS — R06.83 SNORING: ICD-10-CM

## 2024-06-06 RX ORDER — ATORVASTATIN CALCIUM 40 MG/1
TABLET, FILM COATED ORAL
Qty: 90 TABLET | Refills: 1 | Status: SHIPPED | OUTPATIENT
Start: 2024-06-06

## 2024-06-06 RX ORDER — FLUOXETINE HYDROCHLORIDE 40 MG/1
CAPSULE ORAL
Qty: 90 CAPSULE | Refills: 1 | Status: SHIPPED | OUTPATIENT
Start: 2024-06-06

## 2024-06-06 ASSESSMENT — ENCOUNTER SYMPTOMS
DIARRHEA: 0
ABDOMINAL PAIN: 0
EYE REDNESS: 0
NAUSEA: 0
VOMITING: 0
CONSTIPATION: 0
EYE DISCHARGE: 0
COUGH: 0
SORE THROAT: 0
SHORTNESS OF BREATH: 0
BACK PAIN: 0

## 2024-06-06 NOTE — PROGRESS NOTES
After obtaining consent, and per orders of Dr. Iyer, injection of Tdap given in Left deltoid by IRMA YADAV MA. Patient instructed to remain in clinic for 20 minutes afterwards, and to report any adverse reaction to me immediately.    Immunizations Administered       Name Date Dose Route    TDaP, ADACEL (age 10y-64y), BOOSTRIX (age 10y+), IM, 0.5mL 6/6/2024 0.5 mL Intramuscular    Site: Deltoid- Left    Lot: [id number]    NDC: 56328-643-93        VIS declined- vaccine checklist completed. Patient tolerated injection well   
Depression in his maternal aunt; Hearing Loss in his sister; Uterine Cancer in his sister; Vision Loss in his maternal grandfather.    Social History  Elvis  reports that he has never smoked. He has never been exposed to tobacco smoke. He has never used smokeless tobacco. He reports that he does not drink alcohol and does not use drugs.    Medications    Current Outpatient Medications:     FLUoxetine (PROZAC) 40 MG capsule, TAKE 1 CAPSULE BY MOUTH EVERY DAY, Disp: 90 capsule, Rfl: 1    atorvastatin (LIPITOR) 40 MG tablet, TAKE 1 TABLET BY MOUTH EVERY DAY, Disp: 90 tablet, Rfl: 1    Cholecalciferol (VITAMIN D) 2000 units CAPS capsule, Take by mouth daily, Disp: , Rfl:     DAILY MULTIPLE VITAMINS TABS, Take by mouth, Disp: , Rfl:     Digestive Enzyme CAPS, Take by mouth daily , Disp: , Rfl:     Lactobacillus (ACIDOPHILUS) 100 MG CAPS, Take by mouth, Disp: , Rfl:     Subjective:      Review of Systems   Constitutional:  Positive for fatigue. Negative for chills, fever and unexpected weight change.   HENT:  Negative for congestion, ear discharge, ear pain, hearing loss and sore throat.         Popping in left ear.   Eyes:  Negative for discharge and redness.   Respiratory:  Negative for cough and shortness of breath.    Cardiovascular:  Negative for chest pain and palpitations.   Gastrointestinal:  Negative for abdominal pain, constipation, diarrhea, nausea and vomiting.   Genitourinary:  Positive for difficulty urinating. Negative for dysuria.   Musculoskeletal:  Negative for arthralgias, back pain, gait problem and neck pain.   Skin:  Negative for rash.   Allergic/Immunologic: Negative for environmental allergies.   Neurological:  Negative for headaches.        Increasing difficulty with word recall.     Psychiatric/Behavioral:  Negative for dysphoric mood and sleep disturbance. The patient is not nervous/anxious.        Objective:     Vitals:    06/06/24 0903   BP: 118/86   Site: Right Upper Arm   Position: Sitting

## 2024-10-28 ENCOUNTER — OFFICE VISIT (OUTPATIENT)
Dept: FAMILY MEDICINE CLINIC | Age: 64
End: 2024-10-28

## 2024-10-28 VITALS
OXYGEN SATURATION: 97 % | HEART RATE: 72 BPM | DIASTOLIC BLOOD PRESSURE: 70 MMHG | SYSTOLIC BLOOD PRESSURE: 112 MMHG | RESPIRATION RATE: 16 BRPM | BODY MASS INDEX: 26.47 KG/M2 | WEIGHT: 164 LBS

## 2024-10-28 DIAGNOSIS — R06.83 SNORING: ICD-10-CM

## 2024-10-28 DIAGNOSIS — Z80.8 FAMILY HISTORY OF MALIGNANT MELANOMA OF SKIN: ICD-10-CM

## 2024-10-28 DIAGNOSIS — L82.0 SEBORRHEIC KERATOSES, INFLAMED: Primary | ICD-10-CM

## 2024-10-28 DIAGNOSIS — H91.93 BILATERAL HEARING LOSS, UNSPECIFIED HEARING LOSS TYPE: ICD-10-CM

## 2024-10-28 DIAGNOSIS — R20.0 NUMBNESS OF UPPER EXTREMITY: ICD-10-CM

## 2024-10-28 SDOH — ECONOMIC STABILITY: INCOME INSECURITY: HOW HARD IS IT FOR YOU TO PAY FOR THE VERY BASICS LIKE FOOD, HOUSING, MEDICAL CARE, AND HEATING?: NOT HARD AT ALL

## 2024-10-28 SDOH — ECONOMIC STABILITY: FOOD INSECURITY: WITHIN THE PAST 12 MONTHS, YOU WORRIED THAT YOUR FOOD WOULD RUN OUT BEFORE YOU GOT MONEY TO BUY MORE.: NEVER TRUE

## 2024-10-28 SDOH — ECONOMIC STABILITY: TRANSPORTATION INSECURITY
IN THE PAST 12 MONTHS, HAS LACK OF TRANSPORTATION KEPT YOU FROM MEETINGS, WORK, OR FROM GETTING THINGS NEEDED FOR DAILY LIVING?: NO

## 2024-10-28 SDOH — ECONOMIC STABILITY: FOOD INSECURITY: WITHIN THE PAST 12 MONTHS, THE FOOD YOU BOUGHT JUST DIDN'T LAST AND YOU DIDN'T HAVE MONEY TO GET MORE.: NEVER TRUE

## 2024-10-28 ASSESSMENT — ENCOUNTER SYMPTOMS
ALLERGIC/IMMUNOLOGIC NEGATIVE: 1
GASTROINTESTINAL NEGATIVE: 1
RESPIRATORY NEGATIVE: 1
EYES NEGATIVE: 1

## 2024-10-28 NOTE — PROGRESS NOTES
[facility] PROFESSIONAL SERVS  [facility]  [address] [address]  Dept: [phone]  Dept Fax: [phone]  Loc: [phone]      Elvis Talbot is a 64 y.o. male who presents todayfor Lesion(s) (Growth on faces- changing - possible derm referral ) and Flu Vaccine      :   HPI    Pt presents with changing lesions on bilateral temples. First noticed them and had them seen at last well visit several months ago. Starting to itch and are bigger than usual- seem to be getting bigger. Has not used any anti-itch remedies.     Family history of skin cancer; Nephew with melanoma. Has had them taken off before. Wants them taken off if they can be taken off.     Wife wants referral to dermatologist.     Also reports arm pain and falling asleep at night. Occurs when laying on side. Happens on both arms when on either side. Seems to be positional.     patient has No Known Allergies.    Past MedicalHistory  Elvis  has a past medical history of Asthma, Depression, GERD (gastroesophageal reflux disease), Hearing loss, Hyperlipidemia, and Irritable bowel syndrome (IBS).    Past Surgical History  The patient  has a past surgical history that includes Appendectomy and Tonsillectomy.    Family History  This patient's family history includes Alzheimer's Disease in his mother; Cancer in his sister; Depression in his maternal aunt; Hearing Loss in his sister; Uterine Cancer in his sister; Vision Loss in his maternal grandfather.    Social History  Elvis  reports that he has never smoked. He has never been exposed to tobacco smoke. He has never used smokeless tobacco. He reports that he does not drink alcohol and does not use drugs.    Medications    Current Outpatient Medications:     FLUoxetine (PROZAC) 40 MG capsule, TAKE 1 CAPSULE BY MOUTH EVERY DAY, Disp: 90 capsule, Rfl: 1    atorvastatin (LIPITOR) 40 MG tablet, TAKE 1 TABLET BY MOUTH EVERY DAY, Disp: 90 tablet, Rfl: 1    
After obtaining consent, and per orders of Dr. Iyer, injection of FLuad given in Left deltoid by IRMA YADAV MA. Patient instructed to remain in clinic for 20 minutes afterwards, and to report any adverse reaction to me immediately.    Immunizations Administered       Name Date Dose Route    Influenza, FLUAD, (age 65 y+), IM, Trivalent PF, 0.5mL 10/28/2024 0.5 mL Intramuscular    Site: Deltoid- Left    Lot: [id number]    NDC: 06985-129-26          VIS given. Vaccine checklist completed. Patient tolerated appropriately   
declines. Lesions frozen. May require multiple treatments depending on the thickness of the lesions so re-check in 2-3 weeks.      2. Bilateral Arm Pain.  The patient reports numbness and tingling in both arms when falling asleep at night, which seems to be positional. It is not constant and comes and goes. Further evaluation may be needed if symptoms persist or worsen.     1. Seborrheic keratoses, inflamed  Frozen today; see above. Re-check in 2-3 weeks.      2. Family history of malignant melanoma of skin  Full body skin exam negative. Some solar lentigo, seborrheic keratoses and skin tags but no worrisome lesion.      3. Numbness of upper extremity  Intermittent. Offered referral for electromyelogram but declines. Will follow.  Encourage new mattress and pillow for side sleeper. Will discuss again in follow-up.      4. Snoring  Ongoing. Encouraged in hospital sleep study if persistent although prior negative at home. Declines today but will consider in follow-up if persistent.       5. Bilateral hearing loss, unspecified hearing loss type  Getting hearing aides.        Electronically signed by Xiomara Iyer MD on 10/28/2024 at 1:46 PM    The patient (or guardian, if applicable) and other individuals in attendance with the patient were advised that Artificial Intelligence will be utilized during this visit to record, process the conversation to generate a clinical note, and support improvement of the AI technology. The patient (or guardian, if applicable) and other individuals in attendance at the appointment consented to the use of AI, including the recording.

## 2024-12-09 SDOH — ECONOMIC STABILITY: INCOME INSECURITY: HOW HARD IS IT FOR YOU TO PAY FOR THE VERY BASICS LIKE FOOD, HOUSING, MEDICAL CARE, AND HEATING?: NOT HARD AT ALL

## 2024-12-09 SDOH — ECONOMIC STABILITY: FOOD INSECURITY: WITHIN THE PAST 12 MONTHS, YOU WORRIED THAT YOUR FOOD WOULD RUN OUT BEFORE YOU GOT MONEY TO BUY MORE.: NEVER TRUE

## 2024-12-09 SDOH — ECONOMIC STABILITY: FOOD INSECURITY: WITHIN THE PAST 12 MONTHS, THE FOOD YOU BOUGHT JUST DIDN'T LAST AND YOU DIDN'T HAVE MONEY TO GET MORE.: NEVER TRUE

## 2024-12-12 ENCOUNTER — OFFICE VISIT (OUTPATIENT)
Dept: FAMILY MEDICINE CLINIC | Age: 64
End: 2024-12-12

## 2024-12-12 VITALS
SYSTOLIC BLOOD PRESSURE: 108 MMHG | BODY MASS INDEX: 26.2 KG/M2 | OXYGEN SATURATION: 95 % | HEIGHT: 66 IN | RESPIRATION RATE: 16 BRPM | HEART RATE: 72 BPM | DIASTOLIC BLOOD PRESSURE: 70 MMHG | WEIGHT: 163 LBS

## 2024-12-12 DIAGNOSIS — N40.0 BENIGN PROSTATIC HYPERPLASIA, UNSPECIFIED WHETHER LOWER URINARY TRACT SYMPTOMS PRESENT: ICD-10-CM

## 2024-12-12 DIAGNOSIS — F33.42 RECURRENT MAJOR DEPRESSIVE DISORDER, IN FULL REMISSION (HCC): ICD-10-CM

## 2024-12-12 DIAGNOSIS — Z12.11 SCREEN FOR COLON CANCER: ICD-10-CM

## 2024-12-12 DIAGNOSIS — D64.9 ANEMIA, UNSPECIFIED TYPE: ICD-10-CM

## 2024-12-12 DIAGNOSIS — E78.5 HYPERLIPIDEMIA, UNSPECIFIED HYPERLIPIDEMIA TYPE: ICD-10-CM

## 2024-12-12 DIAGNOSIS — Z80.8 FAMILY HISTORY OF MALIGNANT MELANOMA OF SKIN: ICD-10-CM

## 2024-12-12 DIAGNOSIS — Z00.00 VISIT FOR WELL MAN HEALTH CHECK: ICD-10-CM

## 2024-12-12 DIAGNOSIS — Z23 NEED FOR PNEUMOCOCCAL 20-VALENT CONJUGATE VACCINATION: ICD-10-CM

## 2024-12-12 DIAGNOSIS — H91.93 BILATERAL HEARING LOSS, UNSPECIFIED HEARING LOSS TYPE: ICD-10-CM

## 2024-12-12 DIAGNOSIS — L82.0 SEBORRHEIC KERATOSES, INFLAMED: ICD-10-CM

## 2024-12-12 RX ORDER — FLUOXETINE 40 MG/1
CAPSULE ORAL
Qty: 90 CAPSULE | Refills: 1 | Status: SHIPPED | OUTPATIENT
Start: 2024-12-12

## 2024-12-12 NOTE — PROGRESS NOTES
After obtaining consent, and per orders of Dr. Iyer, injection of Prevnar given in Left deltoid by Azael Sawyer LPN. Patient instructed to remain in clinic for 20 minutes afterwards, and to report any adverse reaction to me immediately.    Immunizations Administered       Name Date Dose Route    Pneumococcal, PCV20, PREVNAR 20, (age 6w+), IM, 0.5mL 12/12/2024 0.5 mL Intramuscular    Site: Deltoid- Left    Lot: [id number]    NDC: 0093-2265-24        Pt tolerated injection well. Checklist filled out. VIS given.   
on Monday at [facility]. He is not yet enrolled in Medicare and is currently working on his plan.    FAMILY HISTORY  His paternal grandfather  of a heart attack at the age of 70. Both of his parents lived into their mid-90s and  of heart issues. His sister, who is 10 years older than him, had some heart concerns a few years ago, which improved after she reduced her workload.    MEDICATIONS  Current: Prozac  Discontinued: Flomax    IMMUNIZATIONS  He received the influenza vaccine during his last visit and the COVID-19 booster this week on Monday at [facility].    Patient Active Problem List   Diagnosis    Irritable bowel syndrome (IBS)    Hyperlipidemia    Depression    Actinic keratosis    Plantar wart of right foot    Asymmetrical hearing loss    Benign prostatic hyperplasia with lower urinary tract symptoms    Low serum vitamin D    Memory changes    Major depression in remission (HCC)      Goals    None       The patient has No Known Allergies.    Medical History  Elvis has a past medical history of Asthma, Depression, GERD (gastroesophageal reflux disease), Hearing loss, Hyperlipidemia, and Irritable bowel syndrome (IBS).    Past SurgicalHistory  The patient  has a past surgical history that includes Appendectomy and Tonsillectomy.    Family History  This patient's family history includes Alzheimer's Disease in his mother; Cancer in his sister; Depression in his maternal aunt; Hearing Loss in his sister; Heart Disease in his paternal grandfather; Uterine Cancer in his sister; Vision Loss in his maternal grandfather.    Social History  Elvis  reports that he has never smoked. He has never been exposed to tobacco smoke. He has never used smokeless tobacco. He reports that he does not drink alcohol and does not use drugs.    Medications    Current Outpatient Medications:     FLUoxetine (PROZAC) 40 MG capsule, TAKE 1 CAPSULE BY MOUTH EVERY DAY, Disp: 90 capsule, Rfl: 1    Cholecalciferol (VITAMIN D) 2000 units CAPS capsule,

## 2025-01-03 LAB — NONINV COLON CA DNA+OCC BLD SCRN STL QL: NEGATIVE

## 2025-01-05 DIAGNOSIS — E78.5 HYPERLIPIDEMIA, UNSPECIFIED HYPERLIPIDEMIA TYPE: ICD-10-CM

## 2025-01-06 NOTE — TELEPHONE ENCOUNTER
Patient's last appointment was : 12/12/2024  Patient's next appointment is : Visit date not found  Last refilled: 06/06/24 90 tab, 1 refill

## 2025-01-07 ENCOUNTER — TELEPHONE (OUTPATIENT)
Dept: FAMILY MEDICINE CLINIC | Age: 65
End: 2025-01-07

## 2025-01-07 NOTE — TELEPHONE ENCOUNTER
Called patient- no answer- left message for patient to return call.    Sent [employer] message

## 2025-01-07 NOTE — TELEPHONE ENCOUNTER
----- Message from Dr. Xiomara Iyer MD sent at 1/6/2025  8:04 PM EST -----  Good news negative screen for colon cancer.

## 2025-01-09 RX ORDER — ATORVASTATIN CALCIUM 40 MG/1
TABLET, FILM COATED ORAL
Qty: 90 TABLET | Refills: 1 | OUTPATIENT
Start: 2025-01-09

## 2025-04-01 ENCOUNTER — OFFICE VISIT (OUTPATIENT)
Dept: UROLOGY | Age: 65
End: 2025-04-01
Payer: COMMERCIAL

## 2025-04-01 VITALS
DIASTOLIC BLOOD PRESSURE: 70 MMHG | BODY MASS INDEX: 26.61 KG/M2 | SYSTOLIC BLOOD PRESSURE: 112 MMHG | WEIGHT: 165.6 LBS | HEIGHT: 66 IN

## 2025-04-01 DIAGNOSIS — N40.1 BENIGN PROSTATIC HYPERPLASIA WITH LOWER URINARY TRACT SYMPTOMS, SYMPTOM DETAILS UNSPECIFIED: Primary | ICD-10-CM

## 2025-04-01 LAB
BILIRUBIN, URINE: NEGATIVE
BLOOD URINE, POC: NEGATIVE
CHARACTER, URINE: CLEAR
COLOR, UA: YELLOW
GLUCOSE URINE: NEGATIVE MG/DL
KETONES, URINE: NEGATIVE
LEUKOCYTE CLUMPS, URINE: NEGATIVE
NITRITE, URINE: NEGATIVE
PH, URINE: 7 (ref 5–9)
POST VOID RESIDUAL (PVR): 0 ML
PROTEIN, URINE: NEGATIVE MG/DL
SPECIFIC GRAVITY UA: 1.01 (ref 1–1.03)
UROBILINOGEN, URINE: 0.2 EU/DL (ref 0–1)

## 2025-04-01 PROCEDURE — 81003 URINALYSIS AUTO W/O SCOPE: CPT | Performed by: NURSE PRACTITIONER

## 2025-04-01 PROCEDURE — 51798 US URINE CAPACITY MEASURE: CPT | Performed by: NURSE PRACTITIONER

## 2025-04-01 PROCEDURE — 99213 OFFICE O/P EST LOW 20 MIN: CPT | Performed by: NURSE PRACTITIONER

## 2025-04-01 PROCEDURE — 1123F ACP DISCUSS/DSCN MKR DOCD: CPT | Performed by: NURSE PRACTITIONER

## 2025-04-01 ASSESSMENT — ENCOUNTER SYMPTOMS
COUGH: 0
ABDOMINAL PAIN: 0
SHORTNESS OF BREATH: 0

## 2025-04-01 NOTE — PATIENT INSTRUCTIONS
Consider cystoscopy.    Stand to urinate.   Prevent constipation.      Get PSA with PCP as ordered       Bladder irritants    For some people, certain foods may irritate the bladder, causing or making bladder symptoms worse. If symptoms are related to diet, avoiding highly acidic or spicy foods, alcohol, or carbonated drinks should bring relief after approximately 10 days. Once the symptoms have improved on a restricted diet, patients can gradually add these foods back into the diet. If one specific food does cause symptoms, it can be easily identified and avoided.     Foods that should be avoided:       Apples     Plums  Apple juice    Strawberries  [place]    Tea  Carbonated beverages   Tomatoes  Chilies/spicy foods   Vinegar  Chocolate    Vitamin B complex  Citrus fruits    Bananas  Coffee (including decaffeinated)  Saccharin sweetners (Sweet'n low)  Cranberries    Soy sauce  Grapes     Alcohol  Guava     Processed meat and fish  Nutrasweet    Tofu  Mayonnaise    Yogurt  Peaches    Pineapple

## 2025-04-01 NOTE — PROGRESS NOTES
[facility] [place] [facility]  [facility]  [address]  [address]  [facility] [address]  Dept: [phone]  Loc: [phone]    Visit Date: 4/1/2025        HPI:   The patient is a 64 y.o. male who presents today for evaluation of the following problems: BPH        BPH  Worsening  Has been on flomax for years  Auass 18- freq, weak stream- mostly dissatisfied  Stopped flomax due to dizziness-- dizziness improved a bit but voiding has worsened.    Seen today 4/1/25.  PVR 0  ml. UA  negative.  NO dysuria. No hematuria.  IPSS 14/2.  Post void dribbling.   Overall feel okay but most bothersome issue is post void dribbling.           Current Outpatient Medications   Medication Sig Dispense Refill    FLUoxetine (PROZAC) 40 MG capsule TAKE 1 CAPSULE BY MOUTH EVERY DAY 90 capsule 1    Cholecalciferol (VITAMIN D) 2000 units CAPS capsule Take by mouth daily      DAILY MULTIPLE VITAMINS TABS Take by mouth      Digestive Enzyme CAPS Take by mouth daily       Lactobacillus (ACIDOPHILUS) 100 MG CAPS Take by mouth      atorvastatin (LIPITOR) 40 MG tablet TAKE 1 TABLET BY MOUTH EVERY DAY (Patient not taking: Reported on 4/1/2025) 90 tablet 1     No current facility-administered medications for this visit.       Past Medical History  Elvis  has a past medical history of Asthma, Depression, GERD (gastroesophageal reflux disease), Hearing loss, Hyperlipidemia, and Irritable bowel syndrome (IBS).    Past Surgical History  The patient  has a past surgical history that includes Appendectomy and Tonsillectomy.    Family History  This patient's family history includes Alzheimer's Disease in his mother; Cancer in his sister; Depression in his maternal aunt; Hearing Loss in his sister; Heart Disease in his paternal grandfather; Uterine Cancer in his sister; Vision Loss in his maternal grandfather.    Social History  Elvis  reports that he has never smoked. He has never been exposed to tobacco smoke. He has

## 2025-04-04 ENCOUNTER — LAB (OUTPATIENT)
Dept: FAMILY MEDICINE CLINIC | Age: 65
End: 2025-04-04
Payer: COMMERCIAL

## 2025-04-04 DIAGNOSIS — N40.0 BENIGN PROSTATIC HYPERPLASIA, UNSPECIFIED WHETHER LOWER URINARY TRACT SYMPTOMS PRESENT: ICD-10-CM

## 2025-04-04 DIAGNOSIS — E78.5 HYPERLIPIDEMIA, UNSPECIFIED HYPERLIPIDEMIA TYPE: ICD-10-CM

## 2025-04-04 LAB
ALBUMIN SERPL BCG-MCNC: 4 G/DL (ref 3.4–4.9)
ALP SERPL-CCNC: 53 U/L (ref 40–129)
ALT SERPL W/O P-5'-P-CCNC: 38 U/L (ref 10–50)
ANION GAP SERPL CALC-SCNC: 12 MEQ/L (ref 8–16)
AST SERPL-CCNC: 28 U/L (ref 10–50)
BASOPHILS ABSOLUTE: 0.1 THOU/MM3 (ref 0–0.1)
BASOPHILS NFR BLD AUTO: 0.9 %
BILIRUB SERPL-MCNC: 0.4 MG/DL (ref 0.3–1.2)
BUN SERPL-MCNC: 15 MG/DL (ref 8–23)
CALCIUM SERPL-MCNC: 8.9 MG/DL (ref 8.8–10.2)
CHLORIDE SERPL-SCNC: 103 MEQ/L (ref 98–111)
CHOLESTEROL, FASTING: 268 MG/DL (ref 100–199)
CO2 SERPL-SCNC: 24 MEQ/L (ref 22–29)
CREAT SERPL-MCNC: 0.9 MG/DL (ref 0.7–1.2)
DEPRECATED RDW RBC AUTO: 45.8 FL (ref 35–45)
EOSINOPHIL NFR BLD AUTO: 2.2 %
EOSINOPHILS ABSOLUTE: 0.1 THOU/MM3 (ref 0–0.4)
ERYTHROCYTE [DISTWIDTH] IN BLOOD BY AUTOMATED COUNT: 13.2 % (ref 11.5–14.5)
GFR SERPL CREATININE-BSD FRML MDRD: > 90 ML/MIN/1.73M2
GLUCOSE SERPL-MCNC: 88 MG/DL (ref 74–109)
HCT VFR BLD AUTO: 43 % (ref 42–52)
HDLC SERPL-MCNC: 54 MG/DL
HGB BLD-MCNC: 14.2 GM/DL (ref 14–18)
IMM GRANULOCYTES # BLD AUTO: 0 THOU/MM3 (ref 0–0.07)
IMM GRANULOCYTES NFR BLD AUTO: 0 %
LDLC SERPL CALC-MCNC: 195 MG/DL
LYMPHOCYTES ABSOLUTE: 1.6 THOU/MM3 (ref 1–4.8)
LYMPHOCYTES NFR BLD AUTO: 24.3 %
MCH RBC QN AUTO: 31.1 PG (ref 26–33)
MCHC RBC AUTO-ENTMCNC: 33 GM/DL (ref 32.2–35.5)
MCV RBC AUTO: 94.3 FL (ref 80–94)
MONOCYTES ABSOLUTE: 0.5 THOU/MM3 (ref 0.4–1.3)
MONOCYTES NFR BLD AUTO: 7.9 %
NEUTROPHILS ABSOLUTE: 4.2 THOU/MM3 (ref 1.8–7.7)
NEUTROPHILS NFR BLD AUTO: 64.7 %
NRBC BLD AUTO-RTO: 0 /100 WBC
PLATELET # BLD AUTO: 219 THOU/MM3 (ref 130–400)
PMV BLD AUTO: 10.4 FL (ref 9.4–12.4)
POTASSIUM SERPL-SCNC: 4.1 MEQ/L (ref 3.5–5.2)
PROT SERPL-MCNC: 6.7 G/DL (ref 6.4–8.3)
PSA SERPL-MCNC: 1.54 NG/ML (ref 0–1)
RBC # BLD AUTO: 4.56 MILL/MM3 (ref 4.7–6.1)
SODIUM SERPL-SCNC: 139 MEQ/L (ref 135–145)
TRIGLYCERIDE, FASTING: 93 MG/DL (ref 0–199)
WBC # BLD AUTO: 6.5 THOU/MM3 (ref 4.8–10.8)

## 2025-04-04 PROCEDURE — 36415 COLL VENOUS BLD VENIPUNCTURE: CPT | Performed by: STUDENT IN AN ORGANIZED HEALTH CARE EDUCATION/TRAINING PROGRAM

## 2025-04-08 ENCOUNTER — RESULTS FOLLOW-UP (OUTPATIENT)
Dept: FAMILY MEDICINE CLINIC | Age: 65
End: 2025-04-08

## 2025-04-09 NOTE — RESULT ENCOUNTER NOTE
Cholesterol is high again off medications. Schedule follow-up to chat about this.  Estimated risk changed from 7.8% with medication to 11.9% off medication meaning that if 100 people take medication for 10 years it will prevent roughly 4 heart attacks or strokes over this time.  8 of the hundred people would still have a stroke taking medication and 88 of the people would not have a stroke either with or without the medication.  We can chat through any side effects you may be having and decide how you would like to proceed given this data.  Prostate lab stable overall. Okay to repeat in 1 year.  Stable labs otherwise.      The 10-year ASCVD risk score (Martha SAUCEDA, et al., 2019) is: 11.9%    Values used to calculate the score:      Age: 64 years      Sex: Male      Is Non- : No      Diabetic: No      Tobacco smoker: No      Systolic Blood Pressure: 112 mmHg      Is BP treated: No      HDL Cholesterol: 54 mg/dL      Total Cholesterol: 268 mg/dl

## 2025-07-03 DIAGNOSIS — E78.5 HYPERLIPIDEMIA, UNSPECIFIED HYPERLIPIDEMIA TYPE: ICD-10-CM

## 2025-07-03 RX ORDER — ATORVASTATIN CALCIUM 40 MG/1
TABLET, FILM COATED ORAL
Qty: 90 TABLET | Refills: 1 | Status: SHIPPED | OUTPATIENT
Start: 2025-07-03

## 2025-08-29 DIAGNOSIS — F33.42 RECURRENT MAJOR DEPRESSIVE DISORDER, IN FULL REMISSION: ICD-10-CM

## 2025-09-04 RX ORDER — FLUOXETINE HYDROCHLORIDE 40 MG/1
CAPSULE ORAL DAILY
Qty: 90 CAPSULE | Refills: 0 | Status: SHIPPED | OUTPATIENT
Start: 2025-09-04